# Patient Record
Sex: FEMALE | Race: WHITE | NOT HISPANIC OR LATINO | URBAN - METROPOLITAN AREA
[De-identification: names, ages, dates, MRNs, and addresses within clinical notes are randomized per-mention and may not be internally consistent; named-entity substitution may affect disease eponyms.]

---

## 2019-03-28 ENCOUNTER — APPOINTMENT (RX ONLY)
Dept: URBAN - METROPOLITAN AREA CLINIC 91 | Facility: CLINIC | Age: 49
Setting detail: DERMATOLOGY
End: 2019-03-28

## 2019-03-28 DIAGNOSIS — L71.8 OTHER ROSACEA: ICD-10-CM

## 2019-03-28 DIAGNOSIS — L21.8 OTHER SEBORRHEIC DERMATITIS: ICD-10-CM

## 2019-03-28 PROCEDURE — ? PRESCRIPTION

## 2019-03-28 PROCEDURE — 99202 OFFICE O/P NEW SF 15 MIN: CPT

## 2019-03-28 PROCEDURE — ? COUNSELING

## 2019-03-28 PROCEDURE — ? TREATMENT REGIMEN

## 2019-03-28 RX ORDER — KETOCONAZOLE 20.5 MG/ML
SHAMPOO, SUSPENSION TOPICAL BIW
Qty: 1 | Refills: 3 | Status: CANCELLED
Stop reason: CLARIF

## 2019-03-28 RX ORDER — DOXYCYCLINE HYCLATE 20 MG/1
TABLET, FILM COATED ORAL
Qty: 60 | Refills: 1

## 2019-03-28 RX ORDER — KETOCONAZOLE 20 MG/G
CREAM TOPICAL
Qty: 1 | Refills: 11

## 2019-03-28 ASSESSMENT — LOCATION SIMPLE DESCRIPTION DERM
LOCATION SIMPLE: RIGHT CHEEK
LOCATION SIMPLE: LEFT SCALP
LOCATION SIMPLE: LEFT CHEEK

## 2019-03-28 ASSESSMENT — LOCATION DETAILED DESCRIPTION DERM
LOCATION DETAILED: LEFT MEDIAL FRONTAL SCALP
LOCATION DETAILED: LEFT MEDIAL MALAR CHEEK
LOCATION DETAILED: RIGHT MEDIAL MALAR CHEEK

## 2019-03-28 ASSESSMENT — LOCATION ZONE DERM
LOCATION ZONE: SCALP
LOCATION ZONE: FACE

## 2019-03-28 NOTE — PROCEDURE: TREATMENT REGIMEN
Modify Regimen: Increase Doxycycline 20mg one tablet twice a day
Continue Regimen: Metrogel 1% cream at night. Dapsone in the morning.
Detail Level: Zone

## 2019-03-28 NOTE — HPI: SECONDARY COMPLAINT
How Severe Is This Condition?: mild
Additional History: Patient states that the Ketoconazole Shampoo helps keep it under control. \\nNo pain per patient

## 2019-03-28 NOTE — PROCEDURE: MIPS QUALITY
Quality 110: Preventive Care And Screening: Influenza Immunization: Influenza Immunization previously received during influenza season
Detail Level: Detailed
Quality 130: Documentation Of Current Medications In The Medical Record: Current Medications Documented
Quality 111:Pneumonia Vaccination Status For Older Adults: Pneumococcal Vaccination not Administered or Previously Received, Reason not Otherwise Specified
Quality 131: Pain Assessment And Follow-Up: Pain assessment using a standardized tool is documented as negative, no follow-up plan required

## 2019-03-28 NOTE — HPI: RASH (ROSACEA)
How Severe Is Your Rosacea?: mild
Is This A New Presentation, Or A Follow-Up?: Rosacea
Additional History: Patient states that she is taking Doxycycline 20mg once a day and also using Metrogel once a day to the face.

## 2019-04-29 ENCOUNTER — APPOINTMENT (RX ONLY)
Dept: URBAN - METROPOLITAN AREA CLINIC 91 | Facility: CLINIC | Age: 49
Setting detail: DERMATOLOGY
End: 2019-04-29

## 2019-04-29 DIAGNOSIS — L21.8 OTHER SEBORRHEIC DERMATITIS: ICD-10-CM

## 2019-04-29 DIAGNOSIS — L71.8 OTHER ROSACEA: ICD-10-CM

## 2019-04-29 PROCEDURE — 99213 OFFICE O/P EST LOW 20 MIN: CPT

## 2019-04-29 PROCEDURE — ? TREATMENT REGIMEN

## 2019-04-29 PROCEDURE — ? PRESCRIPTION

## 2019-04-29 PROCEDURE — ? COUNSELING

## 2019-04-29 RX ORDER — KETOCONAZOLE 20.5 MG/ML
SHAMPOO, SUSPENSION TOPICAL
Qty: 1 | Refills: 11

## 2019-04-29 RX ORDER — DAPSONE 75 MG/G
GEL TOPICAL
Qty: 1 | Refills: 11

## 2019-04-29 RX ORDER — METRONIDAZOLE 10 MG/G
GEL TOPICAL
Qty: 1 | Refills: 11

## 2019-04-29 RX ORDER — KETOCONAZOLE 20 MG/G
CREAM TOPICAL
Qty: 1 | Refills: 11

## 2019-04-29 ASSESSMENT — LOCATION SIMPLE DESCRIPTION DERM
LOCATION SIMPLE: LEFT CHEEK
LOCATION SIMPLE: RIGHT CHEEK
LOCATION SIMPLE: LEFT SCALP

## 2019-04-29 ASSESSMENT — LOCATION DETAILED DESCRIPTION DERM
LOCATION DETAILED: LEFT MEDIAL MALAR CHEEK
LOCATION DETAILED: LEFT MEDIAL FRONTAL SCALP
LOCATION DETAILED: RIGHT MEDIAL MALAR CHEEK

## 2019-04-29 ASSESSMENT — LOCATION ZONE DERM
LOCATION ZONE: SCALP
LOCATION ZONE: FACE

## 2019-04-29 NOTE — PROCEDURE: TREATMENT REGIMEN
Modify Regimen: Increase Doxycycline 20mg one tablet twice a day
Detail Level: Zone
Continue Regimen: Metrogel 1% cream at night. Dapsone in the morning.

## 2019-06-04 ENCOUNTER — RX ONLY (OUTPATIENT)
Age: 49
Setting detail: RX ONLY
End: 2019-06-04

## 2019-06-04 ENCOUNTER — APPOINTMENT (RX ONLY)
Dept: URBAN - METROPOLITAN AREA CLINIC 91 | Facility: CLINIC | Age: 49
Setting detail: DERMATOLOGY
End: 2019-06-04

## 2019-06-04 DIAGNOSIS — L71.8 OTHER ROSACEA: ICD-10-CM

## 2019-06-04 DIAGNOSIS — L21.8 OTHER SEBORRHEIC DERMATITIS: ICD-10-CM

## 2019-06-04 PROCEDURE — ? TREATMENT REGIMEN

## 2019-06-04 PROCEDURE — ? COUNSELING

## 2019-06-04 PROCEDURE — ? PRESCRIPTION

## 2019-06-04 PROCEDURE — 99213 OFFICE O/P EST LOW 20 MIN: CPT

## 2019-06-04 RX ORDER — SODIUM SULFACETAMIDE AND SULFUR 80; 40 MG/ML; MG/ML
SOLUTION TOPICAL
Qty: 1 | Refills: 11 | Status: ERX

## 2019-06-04 RX ORDER — DOXYCYCLINE HYCLATE 20 MG/1
TABLET, FILM COATED ORAL
Qty: 60 | Refills: 1 | Status: ERX

## 2019-06-04 ASSESSMENT — LOCATION SIMPLE DESCRIPTION DERM
LOCATION SIMPLE: RIGHT CHEEK
LOCATION SIMPLE: LEFT SCALP
LOCATION SIMPLE: LEFT CHEEK

## 2019-06-04 ASSESSMENT — LOCATION DETAILED DESCRIPTION DERM
LOCATION DETAILED: LEFT MEDIAL FRONTAL SCALP
LOCATION DETAILED: RIGHT MEDIAL MALAR CHEEK
LOCATION DETAILED: LEFT MEDIAL MALAR CHEEK

## 2019-06-04 ASSESSMENT — LOCATION ZONE DERM
LOCATION ZONE: SCALP
LOCATION ZONE: FACE

## 2019-06-04 NOTE — PROCEDURE: TREATMENT REGIMEN
Action 2: Continue
Continue Regimen: Ketoconazole 2% cream and ketoconazole 2% shampoo
Continue Regimen: Metrogel 1% cream at night. Dapsone in the morning.
Modify Regimen: Increase Doxycycline 20mg one tablet twice a day
Detail Level: Zone

## 2019-06-05 RX ORDER — KETOCONAZOLE 20.5 MG/ML
SHAMPOO, SUSPENSION TOPICAL
Qty: 1 | Refills: 11 | Status: ERX

## 2019-06-05 RX ORDER — KETOCONAZOLE 20 MG/G
CREAM TOPICAL
Qty: 1 | Refills: 11 | Status: ERX

## 2019-06-05 RX ORDER — DOXYCYCLINE HYCLATE 20 MG/1
TABLET, FILM COATED ORAL
Qty: 60 | Refills: 1 | Status: ERX

## 2019-06-05 RX ORDER — METRONIDAZOLE 10 MG/G
GEL TOPICAL
Qty: 1 | Refills: 11 | Status: ERX

## 2019-06-10 ENCOUNTER — APPOINTMENT (RX ONLY)
Dept: URBAN - METROPOLITAN AREA CLINIC 91 | Facility: CLINIC | Age: 49
Setting detail: DERMATOLOGY
End: 2019-06-10

## 2019-06-10 DIAGNOSIS — L259 CONTACT DERMATITIS AND OTHER ECZEMA, UNSPECIFIED CAUSE: ICD-10-CM

## 2019-06-10 PROBLEM — L23.9 ALLERGIC CONTACT DERMATITIS, UNSPECIFIED CAUSE: Status: ACTIVE | Noted: 2019-06-10

## 2019-06-10 PROCEDURE — ? PRESCRIPTION

## 2019-06-10 PROCEDURE — 99212 OFFICE O/P EST SF 10 MIN: CPT

## 2019-06-10 PROCEDURE — ? COUNSELING

## 2019-06-10 PROCEDURE — ? TREATMENT REGIMEN

## 2019-06-10 RX ORDER — HYDROCORTISONE 25 MG/G
CREAM TOPICAL
Qty: 1 | Refills: 11 | Status: ERX

## 2019-06-10 ASSESSMENT — LOCATION ZONE DERM: LOCATION ZONE: FACE

## 2019-06-10 ASSESSMENT — LOCATION DETAILED DESCRIPTION DERM: LOCATION DETAILED: LEFT INFERIOR MEDIAL MALAR CHEEK

## 2019-06-10 ASSESSMENT — LOCATION SIMPLE DESCRIPTION DERM: LOCATION SIMPLE: LEFT CHEEK

## 2019-06-10 NOTE — PROCEDURE: MIPS QUALITY
Quality 110: Preventive Care And Screening: Influenza Immunization: Influenza Immunization previously received during influenza season
Quality 130: Documentation Of Current Medications In The Medical Record: Current Medications Documented
Quality 131: Pain Assessment And Follow-Up: Pain assessment using a standardized tool is documented as negative, no follow-up plan required
Quality 111:Pneumonia Vaccination Status For Older Adults: Pneumococcal Vaccination not Administered or Previously Received, Reason not Otherwise Specified
Detail Level: Detailed

## 2019-07-09 ENCOUNTER — APPOINTMENT (RX ONLY)
Dept: URBAN - METROPOLITAN AREA CLINIC 91 | Facility: CLINIC | Age: 49
Setting detail: DERMATOLOGY
End: 2019-07-09

## 2019-07-09 DIAGNOSIS — L71.8 OTHER ROSACEA: ICD-10-CM

## 2019-07-09 DIAGNOSIS — L259 CONTACT DERMATITIS AND OTHER ECZEMA, UNSPECIFIED CAUSE: ICD-10-CM | Status: RESOLVING

## 2019-07-09 PROBLEM — L23.9 ALLERGIC CONTACT DERMATITIS, UNSPECIFIED CAUSE: Status: ACTIVE | Noted: 2019-07-09

## 2019-07-09 PROCEDURE — ? COUNSELING

## 2019-07-09 PROCEDURE — ? TREATMENT REGIMEN

## 2019-07-09 PROCEDURE — 99213 OFFICE O/P EST LOW 20 MIN: CPT

## 2019-07-09 ASSESSMENT — LOCATION ZONE DERM: LOCATION ZONE: FACE

## 2019-07-09 ASSESSMENT — LOCATION SIMPLE DESCRIPTION DERM: LOCATION SIMPLE: LEFT CHEEK

## 2019-07-09 ASSESSMENT — LOCATION DETAILED DESCRIPTION DERM
LOCATION DETAILED: LEFT INFERIOR MEDIAL MALAR CHEEK
LOCATION DETAILED: LEFT MEDIAL MALAR CHEEK

## 2019-07-09 NOTE — PROCEDURE: MIPS QUALITY
Quality 110: Preventive Care And Screening: Influenza Immunization: Influenza Immunization previously received during influenza season
Quality 111:Pneumonia Vaccination Status For Older Adults: Pneumococcal Vaccination not Administered or Previously Received, Reason not Otherwise Specified
Quality 130: Documentation Of Current Medications In The Medical Record: Current Medications Documented
Quality 131: Pain Assessment And Follow-Up: Pain assessment using a standardized tool is documented as negative, no follow-up plan required
Detail Level: Detailed

## 2019-07-09 NOTE — PROCEDURE: TREATMENT REGIMEN
Detail Level: Zone
Continue Regimen: Metrogel 1% cream at bedtime\\nDapsone cream once daily in the morning\\nDoxycycline 20mg one tablet twice a day
Discontinue Regimen: Hydrocortisone 2.5% cream

## 2019-08-13 ENCOUNTER — APPOINTMENT (RX ONLY)
Dept: URBAN - METROPOLITAN AREA CLINIC 91 | Facility: CLINIC | Age: 49
Setting detail: DERMATOLOGY
End: 2019-08-13

## 2019-08-13 DIAGNOSIS — L71.8 OTHER ROSACEA: ICD-10-CM | Status: WELL CONTROLLED

## 2019-08-13 DIAGNOSIS — L259 CONTACT DERMATITIS AND OTHER ECZEMA, UNSPECIFIED CAUSE: ICD-10-CM | Status: RESOLVED

## 2019-08-13 DIAGNOSIS — L21.8 OTHER SEBORRHEIC DERMATITIS: ICD-10-CM

## 2019-08-13 PROBLEM — L23.9 ALLERGIC CONTACT DERMATITIS, UNSPECIFIED CAUSE: Status: ACTIVE | Noted: 2019-08-13

## 2019-08-13 PROCEDURE — ? COUNSELING

## 2019-08-13 PROCEDURE — ? TREATMENT REGIMEN

## 2019-08-13 PROCEDURE — 99213 OFFICE O/P EST LOW 20 MIN: CPT

## 2019-08-13 ASSESSMENT — LOCATION ZONE DERM
LOCATION ZONE: FACE
LOCATION ZONE: SCALP

## 2019-08-13 ASSESSMENT — LOCATION DETAILED DESCRIPTION DERM
LOCATION DETAILED: LEFT MEDIAL MALAR CHEEK
LOCATION DETAILED: LEFT MEDIAL FRONTAL SCALP
LOCATION DETAILED: LEFT INFERIOR MEDIAL MALAR CHEEK

## 2019-08-13 ASSESSMENT — LOCATION SIMPLE DESCRIPTION DERM
LOCATION SIMPLE: LEFT CHEEK
LOCATION SIMPLE: LEFT SCALP

## 2019-08-13 NOTE — PROCEDURE: TREATMENT REGIMEN
Continue Regimen: Nizoral Shampoo
Continue Regimen: Metronidazole cream at bedtime\\nDapsone cream once daily in the morning\\nDoxycycline 20mg one tablet twice a day
Detail Level: Zone
Discontinue Regimen: Hydrocortisone 2.5% cream

## 2019-08-13 NOTE — PROCEDURE: MIPS QUALITY
Quality 110: Preventive Care And Screening: Influenza Immunization: Influenza Immunization previously received during influenza season
Detail Level: Detailed
Quality 131: Pain Assessment And Follow-Up: Pain assessment using a standardized tool is documented as negative, no follow-up plan required
Quality 111:Pneumonia Vaccination Status For Older Adults: Pneumococcal Vaccination not Administered or Previously Received, Reason not Otherwise Specified
Quality 130: Documentation Of Current Medications In The Medical Record: Current Medications Documented

## 2019-10-03 ENCOUNTER — APPOINTMENT (RX ONLY)
Dept: URBAN - METROPOLITAN AREA CLINIC 91 | Facility: CLINIC | Age: 49
Setting detail: DERMATOLOGY
End: 2019-10-03

## 2019-10-03 DIAGNOSIS — L30.9 DERMATITIS, UNSPECIFIED: ICD-10-CM

## 2019-10-03 PROCEDURE — ? PRESCRIPTION

## 2019-10-03 PROCEDURE — ? ORDER TESTS

## 2019-10-03 PROCEDURE — 99213 OFFICE O/P EST LOW 20 MIN: CPT

## 2019-10-03 PROCEDURE — ? COUNSELING

## 2019-10-03 RX ORDER — MOMETASONE FUROATE 1 MG/G
CREAM TOPICAL
Qty: 1 | Refills: 0 | Status: ERX

## 2019-10-03 RX ORDER — DOXYCYCLINE HYCLATE 100 MG/1
CAPSULE, GELATIN COATED ORAL
Qty: 14 | Refills: 0 | Status: ERX

## 2019-10-03 RX ORDER — MUPIROCIN 20 MG/G
OINTMENT TOPICAL
Qty: 1 | Refills: 11 | Status: ERX

## 2019-10-03 ASSESSMENT — LOCATION ZONE DERM
LOCATION ZONE: LIP
LOCATION ZONE: FACE

## 2019-10-03 ASSESSMENT — LOCATION SIMPLE DESCRIPTION DERM
LOCATION SIMPLE: RIGHT EYEBROW
LOCATION SIMPLE: LEFT EYEBROW
LOCATION SIMPLE: UPPER LIP

## 2019-10-03 ASSESSMENT — LOCATION DETAILED DESCRIPTION DERM
LOCATION DETAILED: RIGHT CENTRAL EYEBROW
LOCATION DETAILED: LEFT CENTRAL EYEBROW
LOCATION DETAILED: PHILTRUM

## 2019-10-03 NOTE — PROCEDURE: MIPS QUALITY
Quality 130: Documentation Of Current Medications In The Medical Record: Current Medications Documented
Quality 110: Preventive Care And Screening: Influenza Immunization: Influenza Immunization previously received during influenza season
Detail Level: Detailed
Quality 111:Pneumonia Vaccination Status For Older Adults: Pneumococcal Vaccination not Administered or Previously Received, Reason not Otherwise Specified

## 2019-10-03 NOTE — HPI: RASH
What Type Of Note Output Would You Prefer (Optional)?: Standard Output
How Severe Is Your Rash?: severe
Is This A New Presentation, Or A Follow-Up?: Rash
Additional History: 0/10 on pain scale.

## 2019-10-11 ENCOUNTER — APPOINTMENT (RX ONLY)
Dept: URBAN - METROPOLITAN AREA CLINIC 91 | Facility: CLINIC | Age: 49
Setting detail: DERMATOLOGY
End: 2019-10-11

## 2019-10-11 DIAGNOSIS — L30.9 DERMATITIS, UNSPECIFIED: ICD-10-CM

## 2019-10-11 PROCEDURE — ? PRESCRIPTION

## 2019-10-11 PROCEDURE — ? TREATMENT REGIMEN

## 2019-10-11 PROCEDURE — ? DIAGNOSIS COMMENT

## 2019-10-11 PROCEDURE — ? COUNSELING

## 2019-10-11 PROCEDURE — 99213 OFFICE O/P EST LOW 20 MIN: CPT

## 2019-10-11 RX ORDER — CIPROFLOXACIN HYDROCHLORIDE 500 MG/1
TABLET, FILM COATED ORAL
Qty: 14 | Refills: 0 | Status: ERX

## 2019-10-11 NOTE — PROCEDURE: TREATMENT REGIMEN
Discontinue Regimen: Doxycycline hyclate
Detail Level: Zone
Plan: Initiate Cipro and continue topicals for one week, discontinue regimen for one week, then follow up
Continue Regimen: Mupirocin ointment twice daily and Elocon cream twice daily

## 2019-10-29 ENCOUNTER — APPOINTMENT (RX ONLY)
Dept: URBAN - METROPOLITAN AREA CLINIC 91 | Facility: CLINIC | Age: 49
Setting detail: DERMATOLOGY
End: 2019-10-29

## 2019-10-29 DIAGNOSIS — L21.8 OTHER SEBORRHEIC DERMATITIS: ICD-10-CM

## 2019-10-29 DIAGNOSIS — L30.9 DERMATITIS, UNSPECIFIED: ICD-10-CM | Status: IMPROVED

## 2019-10-29 PROCEDURE — ? DIAGNOSIS COMMENT

## 2019-10-29 PROCEDURE — 99213 OFFICE O/P EST LOW 20 MIN: CPT

## 2019-10-29 PROCEDURE — ? TREATMENT REGIMEN

## 2019-10-29 PROCEDURE — ? COUNSELING

## 2019-10-29 PROCEDURE — ? PRESCRIPTION

## 2019-10-29 RX ORDER — TACROLIMUS 1 MG/G
OINTMENT TOPICAL BID
Qty: 1 | Refills: 11 | Status: ERX

## 2019-10-29 ASSESSMENT — LOCATION SIMPLE DESCRIPTION DERM
LOCATION SIMPLE: LEFT CHEEK
LOCATION SIMPLE: LEFT SCALP

## 2019-10-29 ASSESSMENT — LOCATION ZONE DERM
LOCATION ZONE: FACE
LOCATION ZONE: SCALP

## 2019-10-29 ASSESSMENT — LOCATION DETAILED DESCRIPTION DERM
LOCATION DETAILED: LEFT INFERIOR CENTRAL MALAR CHEEK
LOCATION DETAILED: LEFT MEDIAL FRONTAL SCALP

## 2019-10-29 NOTE — PROCEDURE: MIPS QUALITY
Quality 111:Pneumonia Vaccination Status For Older Adults: Pneumococcal Vaccination not Administered or Previously Received, Reason not Otherwise Specified
Quality 131: Pain Assessment And Follow-Up: Pain assessment using a standardized tool is documented as negative, no follow-up plan required
Quality 130: Documentation Of Current Medications In The Medical Record: Current Medications Documented
Quality 110: Preventive Care And Screening: Influenza Immunization: Influenza Immunization previously received during influenza season
Detail Level: Detailed

## 2019-10-29 NOTE — PROCEDURE: TREATMENT REGIMEN
Continue Regimen: Mupirocin ointment twice daily
Continue Regimen: Nizoral Shampoo wash daily for flares, then two to three times a week for maintenance
Discontinue Regimen: Cipro 500mg and Elocon cream
Detail Level: Zone

## 2019-11-12 ENCOUNTER — APPOINTMENT (RX ONLY)
Dept: URBAN - METROPOLITAN AREA CLINIC 91 | Facility: CLINIC | Age: 49
Setting detail: DERMATOLOGY
End: 2019-11-12

## 2019-11-12 DIAGNOSIS — L30.9 DERMATITIS, UNSPECIFIED: ICD-10-CM | Status: RESOLVING

## 2019-11-12 DIAGNOSIS — D22 MELANOCYTIC NEVI: ICD-10-CM

## 2019-11-12 PROBLEM — D48.5 NEOPLASM OF UNCERTAIN BEHAVIOR OF SKIN: Status: ACTIVE | Noted: 2019-11-12

## 2019-11-12 PROCEDURE — ? DIAGNOSIS COMMENT

## 2019-11-12 PROCEDURE — 11102 TANGNTL BX SKIN SINGLE LES: CPT

## 2019-11-12 PROCEDURE — ? BIOPSY BY SHAVE METHOD

## 2019-11-12 PROCEDURE — ? TREATMENT REGIMEN

## 2019-11-12 PROCEDURE — ? COUNSELING

## 2019-11-12 PROCEDURE — 99213 OFFICE O/P EST LOW 20 MIN: CPT | Mod: 25

## 2019-11-12 ASSESSMENT — LOCATION SIMPLE DESCRIPTION DERM
LOCATION SIMPLE: LEFT CHEEK
LOCATION SIMPLE: RIGHT TEMPLE

## 2019-11-12 ASSESSMENT — LOCATION ZONE DERM: LOCATION ZONE: FACE

## 2019-11-12 ASSESSMENT — LOCATION DETAILED DESCRIPTION DERM
LOCATION DETAILED: LEFT INFERIOR CENTRAL MALAR CHEEK
LOCATION DETAILED: RIGHT CENTRAL TEMPLE

## 2019-11-12 NOTE — PROCEDURE: BIOPSY BY SHAVE METHOD
Anesthesia Type: 1% lidocaine with epinephrine
Hemostasis: Electrocautery
Cryotherapy Text: The wound bed was treated with cryotherapy after the biopsy was performed.
Render In Bullet Format When Appropriate: No
Silver Nitrate Text: The wound bed was treated with silver nitrate after the biopsy was performed.
Billing Type: [employer]
Electrodesiccation And Curettage Text: The wound bed was treated with electrodesiccation and curettage after the biopsy was performed.
Was A Bandage Applied: Yes
Depth Of Biopsy: dermis
Additional Anesthesia Volume In Cc (Will Not Render If 0): 0
Wound Care: Vaseline
Curettage Text: The wound bed was treated with curettage after the biopsy was performed.
Biopsy Type: H and E
Post-care instructions reviewed with the patient in detail. The patient is to keep the biopsy site dry overnight. Remove the bandage and shower as normal with soap and water, dry the area, apply vaseline and a band-aid. Repeat this process daily for five days.
Dressing: bandage
Notification Instructions: Patient will be notified of biopsy results. However, patient instructed to call the office if not contacted within 2 weeks.
Anesthesia Volume In Cc (Will Not Render If 0): 0.5
Biopsy Method: 15 blade
Electrodesiccation Text: The wound bed was treated with electrodesiccation after the biopsy was performed.
Consent was obtained and risks were reviewed including but not limited to scarring, infection, bleeding, scabbing, incomplete removal, nerve damage and allergy to anesthesia.
Type Of Destruction Used: Curettage
Detail Level: Detailed

## 2019-11-12 NOTE — PROCEDURE: MIPS QUALITY
Quality 130: Documentation Of Current Medications In The Medical Record: Current Medications Documented
Quality 111:Pneumonia Vaccination Status For Older Adults: Pneumococcal Vaccination not Administered or Previously Received, Reason not Otherwise Specified
Quality 110: Preventive Care And Screening: Influenza Immunization: Influenza Immunization previously received during influenza season
Detail Level: Detailed
Quality 131: Pain Assessment And Follow-Up: Pain assessment using a standardized tool is documented as negative, no follow-up plan required

## 2019-11-12 NOTE — PROCEDURE: TREATMENT REGIMEN
Detail Level: Zone
Continue Regimen: Protopic ointment twice daily
Discontinue Regimen: Mupirocin ointment

## 2019-12-05 ENCOUNTER — APPOINTMENT (RX ONLY)
Dept: URBAN - METROPOLITAN AREA CLINIC 91 | Facility: CLINIC | Age: 49
Setting detail: DERMATOLOGY
End: 2019-12-05

## 2019-12-05 DIAGNOSIS — L30.9 DERMATITIS, UNSPECIFIED: ICD-10-CM

## 2019-12-05 PROCEDURE — 99213 OFFICE O/P EST LOW 20 MIN: CPT

## 2019-12-05 PROCEDURE — ? COUNSELING

## 2019-12-05 PROCEDURE — ? TREATMENT REGIMEN

## 2019-12-05 ASSESSMENT — LOCATION DETAILED DESCRIPTION DERM: LOCATION DETAILED: LEFT INFERIOR CENTRAL MALAR CHEEK

## 2019-12-05 ASSESSMENT — LOCATION ZONE DERM: LOCATION ZONE: FACE

## 2019-12-05 ASSESSMENT — LOCATION SIMPLE DESCRIPTION DERM: LOCATION SIMPLE: LEFT CHEEK

## 2019-12-05 NOTE — PROCEDURE: TREATMENT REGIMEN
Discontinue Regimen: Mupirocin ointment and protopic ointment
Plan: Protopic ointment twice daily as needed for flares
Detail Level: Zone

## 2020-01-07 ENCOUNTER — APPOINTMENT (RX ONLY)
Dept: URBAN - METROPOLITAN AREA CLINIC 91 | Facility: CLINIC | Age: 50
Setting detail: DERMATOLOGY
End: 2020-01-07

## 2020-01-07 DIAGNOSIS — L21.8 OTHER SEBORRHEIC DERMATITIS: ICD-10-CM

## 2020-01-07 PROCEDURE — ? TREATMENT REGIMEN

## 2020-01-07 PROCEDURE — ? COUNSELING

## 2020-01-07 PROCEDURE — ? DIAGNOSIS COMMENT

## 2020-01-07 PROCEDURE — 99213 OFFICE O/P EST LOW 20 MIN: CPT

## 2020-01-07 ASSESSMENT — LOCATION SIMPLE DESCRIPTION DERM: LOCATION SIMPLE: RIGHT CHEEK

## 2020-01-07 ASSESSMENT — LOCATION ZONE DERM: LOCATION ZONE: FACE

## 2020-01-07 ASSESSMENT — LOCATION DETAILED DESCRIPTION DERM: LOCATION DETAILED: RIGHT INFERIOR MEDIAL MALAR CHEEK

## 2020-01-07 NOTE — PROCEDURE: TREATMENT REGIMEN
Initiate Treatment: Protopic ointment twice daily \\nDoxycycline hyclate 100 mg twice a day, patient has at home
Detail Level: Zone

## 2020-01-07 NOTE — PROCEDURE: MIPS QUALITY
Quality 130: Documentation Of Current Medications In The Medical Record: Current Medications Documented
Detail Level: Detailed
Quality 111:Pneumonia Vaccination Status For Older Adults: Pneumococcal Vaccination not Administered or Previously Received, Reason not Otherwise Specified
Quality 131: Pain Assessment And Follow-Up: Pain assessment using a standardized tool is documented as negative, no follow-up plan required
Quality 110: Preventive Care And Screening: Influenza Immunization: Influenza Immunization previously received during influenza season

## 2020-01-28 ENCOUNTER — APPOINTMENT (RX ONLY)
Dept: URBAN - METROPOLITAN AREA CLINIC 91 | Facility: CLINIC | Age: 50
Setting detail: DERMATOLOGY
End: 2020-01-28

## 2020-01-28 DIAGNOSIS — L71.8 OTHER ROSACEA: ICD-10-CM

## 2020-01-28 DIAGNOSIS — L21.8 OTHER SEBORRHEIC DERMATITIS: ICD-10-CM | Status: IMPROVED

## 2020-01-28 PROCEDURE — ? TREATMENT REGIMEN

## 2020-01-28 PROCEDURE — 99213 OFFICE O/P EST LOW 20 MIN: CPT

## 2020-01-28 PROCEDURE — ? COUNSELING

## 2020-01-28 ASSESSMENT — LOCATION DETAILED DESCRIPTION DERM
LOCATION DETAILED: LEFT MEDIAL MALAR CHEEK
LOCATION DETAILED: RIGHT INFERIOR MEDIAL MALAR CHEEK

## 2020-01-28 ASSESSMENT — LOCATION SIMPLE DESCRIPTION DERM
LOCATION SIMPLE: RIGHT CHEEK
LOCATION SIMPLE: LEFT CHEEK

## 2020-01-28 ASSESSMENT — LOCATION ZONE DERM: LOCATION ZONE: FACE

## 2020-01-28 NOTE — PROCEDURE: TREATMENT REGIMEN
Detail Level: Zone
Initiate Treatment: Protopic ointment twice daily \\nDoxycycline hyclate 20 mg twice a day, patient has at home
Plan: Taking doxycycline 20 mg twice daily

## 2020-01-28 NOTE — PROCEDURE: MIPS QUALITY
Detail Level: Detailed
Quality 110: Preventive Care And Screening: Influenza Immunization: Influenza Immunization previously received during influenza season
Quality 130: Documentation Of Current Medications In The Medical Record: Current Medications Documented
Quality 131: Pain Assessment And Follow-Up: Pain assessment using a standardized tool is documented as negative, no follow-up plan required
Quality 111:Pneumonia Vaccination Status For Older Adults: Pneumococcal Vaccination not Administered or Previously Received, Reason not Otherwise Specified

## 2020-02-25 ENCOUNTER — APPOINTMENT (RX ONLY)
Dept: URBAN - METROPOLITAN AREA CLINIC 91 | Facility: CLINIC | Age: 50
Setting detail: DERMATOLOGY
End: 2020-02-25

## 2020-02-25 DIAGNOSIS — L21.8 OTHER SEBORRHEIC DERMATITIS: ICD-10-CM | Status: IMPROVED

## 2020-02-25 DIAGNOSIS — L71.8 OTHER ROSACEA: ICD-10-CM | Status: IMPROVED

## 2020-02-25 PROCEDURE — 99213 OFFICE O/P EST LOW 20 MIN: CPT

## 2020-02-25 PROCEDURE — ? TREATMENT REGIMEN

## 2020-02-25 PROCEDURE — ? COUNSELING

## 2020-02-25 ASSESSMENT — LOCATION DETAILED DESCRIPTION DERM
LOCATION DETAILED: LEFT MEDIAL MALAR CHEEK
LOCATION DETAILED: RIGHT INFERIOR MEDIAL MALAR CHEEK

## 2020-02-25 ASSESSMENT — LOCATION ZONE DERM: LOCATION ZONE: FACE

## 2020-02-25 ASSESSMENT — LOCATION SIMPLE DESCRIPTION DERM
LOCATION SIMPLE: LEFT CHEEK
LOCATION SIMPLE: RIGHT CHEEK

## 2020-02-25 NOTE — PROCEDURE: MIPS QUALITY
Detail Level: Detailed
Quality 131: Pain Assessment And Follow-Up: Pain assessment using a standardized tool is documented as negative, no follow-up plan required
Quality 111:Pneumonia Vaccination Status For Older Adults: Pneumococcal Vaccination not Administered or Previously Received, Reason not Otherwise Specified
Quality 110: Preventive Care And Screening: Influenza Immunization: Influenza Immunization previously received during influenza season
Quality 130: Documentation Of Current Medications In The Medical Record: Current Medications Documented

## 2020-02-25 NOTE — PROCEDURE: TREATMENT REGIMEN
Continue Regimen: Doxycycline 20 mg twice a day and protopic twice a day for 6 more weeks
Detail Level: Zone
Continue Regimen: Doxycycline 20mg twice a day for 6 more weeks

## 2020-05-27 ENCOUNTER — APPOINTMENT (RX ONLY)
Dept: URBAN - METROPOLITAN AREA CLINIC 91 | Facility: CLINIC | Age: 50
Setting detail: DERMATOLOGY
End: 2020-05-27

## 2020-05-27 DIAGNOSIS — L71.8 OTHER ROSACEA: ICD-10-CM

## 2020-05-27 DIAGNOSIS — L21.8 OTHER SEBORRHEIC DERMATITIS: ICD-10-CM

## 2020-05-27 PROCEDURE — 99213 OFFICE O/P EST LOW 20 MIN: CPT

## 2020-05-27 PROCEDURE — ? REASON FOR TELEMEDICINE VISIT

## 2020-05-27 PROCEDURE — ? PRESCRIPTION

## 2020-05-27 PROCEDURE — ? CONSENT FOR TELEMEDICINE VISIT OBTAINED

## 2020-05-27 PROCEDURE — ? TREATMENT REGIMEN

## 2020-05-27 PROCEDURE — ? COUNSELING

## 2020-05-27 RX ORDER — IVERMECTIN 10 MG/G
CREAM TOPICAL
Qty: 1 | Refills: 11 | Status: ERX

## 2020-05-27 ASSESSMENT — LOCATION SIMPLE DESCRIPTION DERM
LOCATION SIMPLE: LEFT CHEEK
LOCATION SIMPLE: RIGHT CHEEK

## 2020-05-27 ASSESSMENT — LOCATION DETAILED DESCRIPTION DERM
LOCATION DETAILED: RIGHT INFERIOR MEDIAL MALAR CHEEK
LOCATION DETAILED: LEFT MEDIAL MALAR CHEEK

## 2020-05-27 ASSESSMENT — LOCATION ZONE DERM: LOCATION ZONE: FACE

## 2020-05-27 NOTE — PROCEDURE: MIPS QUALITY
Quality 111:Pneumonia Vaccination Status For Older Adults: Pneumococcal Vaccination not Administered or Previously Received, Reason not Otherwise Specified
Quality 110: Preventive Care And Screening: Influenza Immunization: Influenza Immunization previously received during influenza season
Detail Level: Detailed
Quality 131: Pain Assessment And Follow-Up: Pain assessment using a standardized tool is documented as negative, no follow-up plan required
Quality 130: Documentation Of Current Medications In The Medical Record: Current Medications Documented

## 2020-05-27 NOTE — PROCEDURE: TREATMENT REGIMEN
Modify Regimen: Protopic at bedtime
Continue Regimen: Doxycycline 20mg twice a day
Continue Regimen: Ketoconazole shampoo 3 times a week
Detail Level: Zone
Otc Regimen: Cerave mineral sunscreen daily

## 2020-05-27 NOTE — PROCEDURE: CONSENT FOR TELEMEDICINE VISIT OBTAINED
Consent Text: Telehealth video conferencing is a virtual consultation that will not be the same as an in person visit the visit will be billed to applicable Insurance provider that you have provided and patient financial responsibility will still apply in accordance with your benefits. Patient gives verbal consent to engage in the tele-health virtual visit today.

## 2020-06-12 RX ORDER — KETOCONAZOLE 20.5 MG/ML
SHAMPOO, SUSPENSION TOPICAL
Qty: 1 | Refills: 10 | Status: ERX

## 2020-07-22 ENCOUNTER — APPOINTMENT (RX ONLY)
Dept: URBAN - METROPOLITAN AREA CLINIC 91 | Facility: CLINIC | Age: 50
Setting detail: DERMATOLOGY
End: 2020-07-22

## 2020-07-22 DIAGNOSIS — L21.8 OTHER SEBORRHEIC DERMATITIS: ICD-10-CM

## 2020-07-22 DIAGNOSIS — L71.8 OTHER ROSACEA: ICD-10-CM

## 2020-07-22 PROCEDURE — ? CONSENT FOR TELEMEDICINE VISIT OBTAINED

## 2020-07-22 PROCEDURE — ? TREATMENT REGIMEN

## 2020-07-22 PROCEDURE — ? PRESCRIPTION

## 2020-07-22 PROCEDURE — ? REASON FOR TELEMEDICINE VISIT

## 2020-07-22 PROCEDURE — 99213 OFFICE O/P EST LOW 20 MIN: CPT

## 2020-07-22 PROCEDURE — ? COUNSELING

## 2020-07-22 RX ORDER — DOXYCYCLINE HYCLATE 20 MG/1
TABLET, FILM COATED ORAL
Qty: 30 | Refills: 1 | Status: ERX

## 2020-07-22 ASSESSMENT — LOCATION SIMPLE DESCRIPTION DERM
LOCATION SIMPLE: LEFT CHEEK
LOCATION SIMPLE: RIGHT CHEEK
LOCATION SIMPLE: POSTERIOR SCALP

## 2020-07-22 ASSESSMENT — LOCATION ZONE DERM
LOCATION ZONE: SCALP
LOCATION ZONE: FACE

## 2020-07-22 ASSESSMENT — LOCATION DETAILED DESCRIPTION DERM
LOCATION DETAILED: LEFT MEDIAL MALAR CHEEK
LOCATION DETAILED: POSTERIOR MID-PARIETAL SCALP
LOCATION DETAILED: RIGHT INFERIOR MEDIAL MALAR CHEEK

## 2020-07-22 NOTE — PROCEDURE: TREATMENT REGIMEN
Detail Level: Zone
Continue Regimen: Apply soolantra cream to face every morning and protopic ointment apply to face at night
Continue Regimen: Ketoconazole shampoo 3 times a week
Modify Regimen: Doxycycline 20mg one pill once a day
Modify Regimen: Protopic at bedtime
Otc Regimen: Cerave mineral sunscreen daily

## 2020-09-02 ENCOUNTER — APPOINTMENT (RX ONLY)
Dept: URBAN - METROPOLITAN AREA CLINIC 91 | Facility: CLINIC | Age: 50
Setting detail: DERMATOLOGY
End: 2020-09-02

## 2020-09-02 DIAGNOSIS — L21.8 OTHER SEBORRHEIC DERMATITIS: ICD-10-CM | Status: WELL CONTROLLED

## 2020-09-02 DIAGNOSIS — L71.8 OTHER ROSACEA: ICD-10-CM | Status: WELL CONTROLLED

## 2020-09-02 PROCEDURE — 99213 OFFICE O/P EST LOW 20 MIN: CPT

## 2020-09-02 PROCEDURE — ? CONSENT FOR TELEMEDICINE VISIT OBTAINED

## 2020-09-02 PROCEDURE — ? COUNSELING

## 2020-09-02 PROCEDURE — ? REASON FOR TELEMEDICINE VISIT

## 2020-09-02 PROCEDURE — ? TREATMENT REGIMEN

## 2020-09-02 ASSESSMENT — LOCATION DETAILED DESCRIPTION DERM
LOCATION DETAILED: LEFT MEDIAL MALAR CHEEK
LOCATION DETAILED: RIGHT INFERIOR MEDIAL MALAR CHEEK
LOCATION DETAILED: POSTERIOR MID-PARIETAL SCALP

## 2020-09-02 ASSESSMENT — LOCATION ZONE DERM
LOCATION ZONE: SCALP
LOCATION ZONE: FACE

## 2020-09-02 ASSESSMENT — LOCATION SIMPLE DESCRIPTION DERM
LOCATION SIMPLE: LEFT CHEEK
LOCATION SIMPLE: POSTERIOR SCALP
LOCATION SIMPLE: RIGHT CHEEK

## 2020-09-02 NOTE — PROCEDURE: TREATMENT REGIMEN
Detail Level: Zone
Continue Regimen: Ketoconazole shampoo 3 times a week and protopic ointment apply to face at night
Plan: Patient states she has enough of doxycycline for now. Advised to call if she runs out before her next appt and we can call in a prescription to cover her until her next appt.  Cerave mineral sunscreen daily as needed
Continue Regimen: Apply soolantra cream to face every morning and protopic ointment apply to face at night\\nDoxycycline 20mg one pill once a day

## 2020-11-03 ENCOUNTER — APPOINTMENT (RX ONLY)
Dept: URBAN - METROPOLITAN AREA CLINIC 91 | Facility: CLINIC | Age: 50
Setting detail: DERMATOLOGY
End: 2020-11-03

## 2020-11-03 DIAGNOSIS — T300 ERYTHEMA [FIRST DEGREE], UNSPECIFIED SITE: ICD-10-CM

## 2020-11-03 DIAGNOSIS — L71.8 OTHER ROSACEA: ICD-10-CM | Status: WELL CONTROLLED

## 2020-11-03 DIAGNOSIS — L21.8 OTHER SEBORRHEIC DERMATITIS: ICD-10-CM | Status: WELL CONTROLLED

## 2020-11-03 PROBLEM — T22.111A BURN OF FIRST DEGREE OF RIGHT FOREARM, INITIAL ENCOUNTER: Status: ACTIVE | Noted: 2020-11-03

## 2020-11-03 PROCEDURE — ? REASON FOR TELEMEDICINE VISIT

## 2020-11-03 PROCEDURE — ? TREATMENT REGIMEN

## 2020-11-03 PROCEDURE — 99213 OFFICE O/P EST LOW 20 MIN: CPT

## 2020-11-03 PROCEDURE — ? COUNSELING

## 2020-11-03 PROCEDURE — ? CONSENT FOR TELEMEDICINE VISIT OBTAINED

## 2020-11-03 ASSESSMENT — LOCATION SIMPLE DESCRIPTION DERM
LOCATION SIMPLE: RIGHT FOREARM
LOCATION SIMPLE: POSTERIOR SCALP
LOCATION SIMPLE: RIGHT CHEEK
LOCATION SIMPLE: LEFT CHEEK

## 2020-11-03 ASSESSMENT — LOCATION ZONE DERM
LOCATION ZONE: SCALP
LOCATION ZONE: FACE
LOCATION ZONE: ARM

## 2020-11-03 ASSESSMENT — LOCATION DETAILED DESCRIPTION DERM
LOCATION DETAILED: POSTERIOR MID-PARIETAL SCALP
LOCATION DETAILED: RIGHT INFERIOR MEDIAL MALAR CHEEK
LOCATION DETAILED: RIGHT DISTAL DORSAL FOREARM
LOCATION DETAILED: LEFT MEDIAL MALAR CHEEK

## 2020-11-03 NOTE — PROCEDURE: MIPS QUALITY
Quality 111:Pneumonia Vaccination Status For Older Adults: Pneumococcal Vaccination not Administered or Previously Received, Reason not Otherwise Specified
Quality 110: Preventive Care And Screening: Influenza Immunization: Influenza Immunization previously received during influenza season
Quality 131: Pain Assessment And Follow-Up: Pain assessment using a standardized tool is documented as negative, no follow-up plan required
Detail Level: Detailed
Quality 130: Documentation Of Current Medications In The Medical Record: Current Medications Documented

## 2020-11-03 NOTE — PROCEDURE: TREATMENT REGIMEN
Discontinue Regimen: Doxycycline hyclate
Otc Regimen: Cerave mineral sunscreen daily as needed
Otc Regimen: Aloe and vitamin E
Continue Regimen: Ketoconazole shampoo 3 times a week and protopic ointment apply to face at night
Detail Level: Zone
Continue Regimen: Apply soolantra cream to face every morning and protopic ointment apply to face at night

## 2020-11-04 ENCOUNTER — RX ONLY (OUTPATIENT)
Age: 50
Setting detail: RX ONLY
End: 2020-11-04

## 2020-11-04 RX ORDER — TACROLIMUS 1 MG/G
OINTMENT TOPICAL BID
Qty: 1 | Refills: 11 | Status: ERX

## 2021-02-11 ENCOUNTER — APPOINTMENT (RX ONLY)
Dept: URBAN - METROPOLITAN AREA CLINIC 91 | Facility: CLINIC | Age: 51
Setting detail: DERMATOLOGY
End: 2021-02-11

## 2021-02-11 DIAGNOSIS — L21.8 OTHER SEBORRHEIC DERMATITIS: ICD-10-CM | Status: WELL CONTROLLED

## 2021-02-11 DIAGNOSIS — L71.8 OTHER ROSACEA: ICD-10-CM | Status: WELL CONTROLLED

## 2021-02-11 PROCEDURE — ? PRESCRIPTION

## 2021-02-11 PROCEDURE — ? CONSENT FOR TELEMEDICINE VISIT OBTAINED

## 2021-02-11 PROCEDURE — ? PRESCRIPTION MEDICATION MANAGEMENT

## 2021-02-11 PROCEDURE — ? REASON FOR TELEMEDICINE VISIT

## 2021-02-11 PROCEDURE — ? COUNSELING

## 2021-02-11 PROCEDURE — 99214 OFFICE O/P EST MOD 30 MIN: CPT

## 2021-02-11 RX ORDER — TACROLIMUS 1 MG/G
OINTMENT TOPICAL
Qty: 1 | Refills: 11 | Status: ERX

## 2021-02-11 ASSESSMENT — LOCATION ZONE DERM
LOCATION ZONE: SCALP
LOCATION ZONE: FACE

## 2021-02-11 ASSESSMENT — LOCATION SIMPLE DESCRIPTION DERM
LOCATION SIMPLE: LEFT CHEEK
LOCATION SIMPLE: POSTERIOR SCALP
LOCATION SIMPLE: RIGHT CHEEK

## 2021-02-11 ASSESSMENT — LOCATION DETAILED DESCRIPTION DERM
LOCATION DETAILED: RIGHT INFERIOR MEDIAL MALAR CHEEK
LOCATION DETAILED: LEFT MEDIAL MALAR CHEEK
LOCATION DETAILED: POSTERIOR MID-PARIETAL SCALP

## 2021-02-11 NOTE — PROCEDURE: PRESCRIPTION MEDICATION MANAGEMENT
Continue Regimen: Ketoconazole shampoo 3 times a week
Detail Level: Detailed
Render In Strict Bullet Format?: No
Continue Regimen: Apply soolantra cream to face every morning\\nProtopic ointment apply to face at night\\nCerave mineral sunscreen daily as needed

## 2021-06-03 ENCOUNTER — APPOINTMENT (RX ONLY)
Dept: URBAN - METROPOLITAN AREA CLINIC 91 | Facility: CLINIC | Age: 51
Setting detail: DERMATOLOGY
End: 2021-06-03

## 2021-06-03 DIAGNOSIS — L71.8 OTHER ROSACEA: ICD-10-CM | Status: IMPROVED

## 2021-06-03 DIAGNOSIS — L21.8 OTHER SEBORRHEIC DERMATITIS: ICD-10-CM | Status: WELL CONTROLLED

## 2021-06-03 PROCEDURE — ? PRESCRIPTION MEDICATION MANAGEMENT

## 2021-06-03 PROCEDURE — 99214 OFFICE O/P EST MOD 30 MIN: CPT

## 2021-06-03 PROCEDURE — ? COUNSELING

## 2021-06-03 ASSESSMENT — LOCATION SIMPLE DESCRIPTION DERM
LOCATION SIMPLE: LEFT CHEEK
LOCATION SIMPLE: RIGHT CHEEK
LOCATION SIMPLE: POSTERIOR SCALP

## 2021-06-03 ASSESSMENT — LOCATION ZONE DERM
LOCATION ZONE: SCALP
LOCATION ZONE: FACE

## 2021-06-03 NOTE — PROCEDURE: MIPS QUALITY
Detail Level: Detailed
Quality 111:Pneumonia Vaccination Status For Older Adults: Pneumococcal Vaccination not Administered or Previously Received, Reason not Otherwise Specified
Quality 130: Documentation Of Current Medications In The Medical Record: Current Medications Documented
Quality 131: Pain Assessment And Follow-Up: Pain assessment using a standardized tool is documented as negative, no follow-up plan required
Quality 110: Preventive Care And Screening: Influenza Immunization: Influenza Immunization previously received during influenza season

## 2021-06-03 NOTE — PROCEDURE: PRESCRIPTION MEDICATION MANAGEMENT
Continue Regimen: Ketoconazole shampoo 3 times a week as needed
Render In Strict Bullet Format?: No
Continue Regimen: Protopic ointment 0.1% apply to face at night\\nCerave mineral sunscreen daily as needed
Detail Level: Detailed

## 2021-11-15 ENCOUNTER — RX ONLY (OUTPATIENT)
Age: 51
Setting detail: RX ONLY
End: 2021-11-15

## 2021-11-15 RX ORDER — KETOCONAZOLE 20 MG/ML
SHAMPOO, SUSPENSION TOPICAL
Qty: 120 | Refills: 5 | Status: ERX

## 2021-12-01 ENCOUNTER — APPOINTMENT (RX ONLY)
Dept: URBAN - METROPOLITAN AREA CLINIC 91 | Facility: CLINIC | Age: 51
Setting detail: DERMATOLOGY
End: 2021-12-01

## 2021-12-01 DIAGNOSIS — L21.8 OTHER SEBORRHEIC DERMATITIS: ICD-10-CM | Status: WELL CONTROLLED

## 2021-12-01 DIAGNOSIS — L71.8 OTHER ROSACEA: ICD-10-CM | Status: WELL CONTROLLED

## 2021-12-01 DIAGNOSIS — L72.8 OTHER FOLLICULAR CYSTS OF THE SKIN AND SUBCUTANEOUS TISSUE: ICD-10-CM

## 2021-12-01 PROCEDURE — ? PRESCRIPTION MEDICATION MANAGEMENT

## 2021-12-01 PROCEDURE — 99214 OFFICE O/P EST MOD 30 MIN: CPT | Mod: 25

## 2021-12-01 PROCEDURE — ? COUNSELING

## 2021-12-01 PROCEDURE — ? INTRALESIONAL KENALOG

## 2021-12-01 PROCEDURE — 11900 INJECT SKIN LESIONS </W 7: CPT

## 2021-12-01 ASSESSMENT — LOCATION ZONE DERM
LOCATION ZONE: NOSE
LOCATION ZONE: FACE
LOCATION ZONE: SCALP

## 2021-12-01 ASSESSMENT — LOCATION DETAILED DESCRIPTION DERM
LOCATION DETAILED: NASAL SUPRATIP
LOCATION DETAILED: POSTERIOR MID-PARIETAL SCALP
LOCATION DETAILED: RIGHT INFERIOR MEDIAL MALAR CHEEK
LOCATION DETAILED: LEFT MEDIAL MALAR CHEEK

## 2021-12-01 ASSESSMENT — LOCATION SIMPLE DESCRIPTION DERM
LOCATION SIMPLE: POSTERIOR SCALP
LOCATION SIMPLE: RIGHT CHEEK
LOCATION SIMPLE: LEFT CHEEK
LOCATION SIMPLE: NOSE

## 2021-12-01 NOTE — PROCEDURE: INTRALESIONAL KENALOG
Total Volume Injected (Ccs- Only Use Numbers And Decimals): 0.3
X Size Of Lesion In Cm (Optional): 0
Detail Level: Detailed
Validate Note Data When Using Inventory: Yes
Kenalog Preparation: Kenalog
Administered By (Optional): Dr. Pascale Edwards
Treatment Number (Optional): 1
Ndc# For Kenalog Only: 9081-2386-12
Medical Necessity Clause: This procedure was medically necessary because the lesions that were treated were:
Concentration Of Solution Injected (Mg/Ml): 3.3
Consent: The risks of atrophy were reviewed with the patient.

## 2021-12-01 NOTE — PROCEDURE: PRESCRIPTION MEDICATION MANAGEMENT
Detail Level: Detailed
Render In Strict Bullet Format?: No
Continue Regimen: Protopic ointment 0.1% apply to face at night\\nCerave mineral sunscreen daily as needed
Continue Regimen: Ketoconazole shampoo 3 times a week as needed

## 2022-04-25 ENCOUNTER — APPOINTMENT (RX ONLY)
Dept: URBAN - METROPOLITAN AREA CLINIC 91 | Facility: CLINIC | Age: 52
Setting detail: DERMATOLOGY
End: 2022-04-25

## 2022-04-25 DIAGNOSIS — D49.2 NEOPLASM OF UNSPECIFIED BEHAVIOR OF BONE, SOFT TISSUE, AND SKIN: ICD-10-CM

## 2022-04-25 PROCEDURE — 11102 TANGNTL BX SKIN SINGLE LES: CPT

## 2022-04-25 PROCEDURE — ? BIOPSY BY SHAVE METHOD

## 2022-04-25 ASSESSMENT — LOCATION DETAILED DESCRIPTION DERM: LOCATION DETAILED: POSTERIOR MID-PARIETAL SCALP

## 2022-04-25 ASSESSMENT — LOCATION SIMPLE DESCRIPTION DERM: LOCATION SIMPLE: POSTERIOR SCALP

## 2022-04-25 ASSESSMENT — LOCATION ZONE DERM: LOCATION ZONE: SCALP

## 2022-04-25 NOTE — PROCEDURE: BIOPSY BY SHAVE METHOD
Size Of Lesion In Cm: 0.8
Hide Additional Anticipated Plan?: No
Validate Note Data (See Information Below): Yes
Curettage Text: The wound bed was treated with curettage after the biopsy was performed.
Dressing: bandage
Information: Selecting Yes will display possible errors in your note based on the variables you have selected. This validation is only offered as a suggestion for you. PLEASE NOTE THAT THE VALIDATION TEXT WILL BE REMOVED WHEN YOU FINALIZE YOUR NOTE. IF YOU WANT TO FAX A PRELIMINARY NOTE YOU WILL NEED TO TOGGLE THIS TO 'NO' IF YOU DO NOT WANT IT IN YOUR FAXED NOTE.
Biopsy Type: H and E
Detail Level: Detailed
Billing Type: [employer]
Type Of Destruction Used: Curettage
Electrodesiccation And Curettage Text: The wound bed was treated with electrodesiccation and curettage after the biopsy was performed.
Wound Care: Vaseline
Consent was obtained and risks were reviewed including but not limited to scarring, infection, bleeding, scabbing, incomplete removal, nerve damage and allergy to anesthesia.
X Size Of Lesion In Cm: 0
Anesthesia Volume In Cc (Will Not Render If 0): 0.5
Cryotherapy Text: The wound bed was treated with cryotherapy after the biopsy was performed.
Post-care instructions reviewed with the patient in detail. The patient is to keep the biopsy site dry overnight. Remove the bandage and shower as normal with soap and water, dry the area, apply vaseline and a band-aid. Repeat this process daily for five days.
Hemostasis: Electrocautery
Silver Nitrate Text: The wound bed was treated with silver nitrate after the biopsy was performed.
Anesthesia Type: 1% lidocaine with epinephrine
Biopsy Method: 15 blade
Depth Of Biopsy: dermis
Notification Instructions: Patient will be notified of biopsy results. However, patient instructed to call the office if not contacted within 2 weeks.
Electrodesiccation Text: The wound bed was treated with electrodesiccation after the biopsy was performed.

## 2022-06-06 ENCOUNTER — APPOINTMENT (RX ONLY)
Dept: URBAN - METROPOLITAN AREA CLINIC 91 | Facility: CLINIC | Age: 52
Setting detail: DERMATOLOGY
End: 2022-06-06

## 2022-06-06 PROBLEM — C44.41 BASAL CELL CARCINOMA OF SKIN OF SCALP AND NECK: Status: ACTIVE | Noted: 2022-06-06

## 2022-06-06 PROCEDURE — 13121 CMPLX RPR S/A/L 2.6-7.5 CM: CPT

## 2022-06-06 PROCEDURE — 17311 MOHS 1 STAGE H/N/HF/G: CPT

## 2022-06-06 PROCEDURE — ? MOHS SURGERY

## 2022-06-06 PROCEDURE — ? PRESCRIPTION

## 2022-06-06 PROCEDURE — ? PATHOLOGY DISCUSSION

## 2022-06-06 PROCEDURE — ? COUNSELING

## 2022-06-06 RX ORDER — MUPIROCIN 20 MG/G
OINTMENT TOPICAL
Qty: 22 | Refills: 3 | Status: ERX | COMMUNITY
Start: 2022-06-06 | End: 2022-08-08

## 2022-06-06 RX ORDER — CEPHALEXIN 500 MG/1
CAPSULE ORAL
Qty: 21 | Refills: 0 | Status: ERX | COMMUNITY
Start: 2022-06-06

## 2022-06-06 RX ADMIN — CEPHALEXIN: 500 CAPSULE ORAL at 00:00

## 2022-06-06 RX ADMIN — MUPIROCIN: 20 OINTMENT TOPICAL at 00:00

## 2022-06-06 NOTE — PROCEDURE: MOHS SURGERY
Quadrant Reporting?: no
Mustarde Flap Text: The defect edges were debeveled with a #15 scalpel blade. Given the size, depth and location of the defect and the proximity to free margins a Mustarde flap was deemed most appropriate. Using a sterile surgical marker, an appropriate flap was drawn incorporating the defect. The area thus outlined was incised with a #15 scalpel blade. The skin margins were undermined to an appropriate distance in all directions utilizing iris scissors.
Hatchet Flap Text: The defect edges were debeveled with a #15 scalpel blade. Given the location of the defect, shape of the defect and the proximity to free margins a hatchet flap was deemed most appropriate. Using a sterile surgical marker, an appropriate hatchet flap was drawn incorporating the defect and placing the expected incisions within the relaxed skin tension lines where possible. The area thus outlined was incised deep to adipose tissue with a #15 scalpel blade. The skin margins were undermined to an appropriate distance in all directions utilizing iris scissors.
Stage 5: Additional Anesthesia Type: 1% lidocaine with epinephrine
Complex Repair And Flap Additional Text (Will Appearing After The Standard Complex Repair Text): The complex repair was not sufficient to completely close the primary defect. The remaining additional defect was repaired with the flap mentioned below.
Include Anticoagulation In Mohs Note?: Please Select the Appropriate Response
Double O-Z Flap Text: The defect edges were debeveled with a #15 scalpel blade. Given the location of the defect, shape of the defect and the proximity to free margins a Double O-Z flap was deemed most appropriate. Using a sterile surgical marker, an appropriate transposition flap was drawn incorporating the defect and placing the expected incisions within the relaxed skin tension lines where possible. The area thus outlined was incised deep to adipose tissue with a #15 scalpel blade. The skin margins were undermined to an appropriate distance in all directions utilizing iris scissors.
Xenograft Text: The defect edges were debeveled with a #15 scalpel blade. Given the location of the defect, shape of the defect and the proximity to free margins a xenograft was deemed most appropriate. The graft was then trimmed to fit the size of the defect. The graft was then placed in the primary defect and oriented appropriately.
Hemigard Postcare Instructions: The HEMIGARD strips are to remain completely dry for at least 5-7 days.
Burow's Graft Text: The defect edges were debeveled with a #15 scalpel blade. Given the location of the defect, shape of the defect, the proximity to free margins and the presence of a standing cone deformity a Burow's skin graft was deemed most appropriate. The standing cone was removed and this tissue was then trimmed to the shape of the primary defect. The adipose tissue was also removed until only dermis and epidermis were left. The skin margins of the secondary defect were undermined to an appropriate distance in all directions utilizing iris scissors. The secondary defect was closed with interrupted buried subcutaneous sutures. The skin edges were then re-apposed with running  sutures. The skin graft was then placed in the primary defect and oriented appropriately.
Bcc  Nodular Histology Text: There was an atypical proliferation of small cuboidal cells with large nuclear/cytoplasmic ratio, basophilic cytoplasm, and hyperchromatic nuclei in a nodular pattern.
Show Quadrant Variables In The Stage Tabs: Yes
Burow's Advancement Flap Text: The defect edges were debeveled with a #15 scalpel blade. Given the location of the defect and the proximity to free margins a Burow's advancement flap was deemed most appropriate. Using a sterile surgical marker, the appropriate advancement flap was drawn incorporating the defect and placing the expected incisions within the relaxed skin tension lines where possible. The area thus outlined was incised deep to adipose tissue with a #15 scalpel blade. The skin margins were undermined to an appropriate distance in all directions utilizing iris scissors.
Quadrants Reporting?: 0
Keystone Flap Text: The defect edges were debeveled with a #15 scalpel blade. Given the location of the defect, shape of the defect a keystone flap was deemed most appropriate. Using a sterile surgical marker, an appropriate keystone flap was drawn incorporating the defect, outlining the appropriate donor tissue and placing the expected incisions within the relaxed skin tension lines where possible. The area thus outlined was incised deep to adipose tissue with a #15 scalpel blade. The skin margins were undermined to an appropriate distance in all directions around the primary defect and laterally outward around the flap utilizing iris scissors.
Referred To Plastics For Closure Text (Leave Blank If You Do Not Want): After obtaining clear surgical margins the patient was sent to plastics for surgical repair. The patient understands they will receive post-surgical care and follow-up from the referring physician's office.
Trilobed Flap Text: The defect edges were debeveled with a #15 scalpel blade. Given the location of the defect and the proximity to free margins a trilobed flap was deemed most appropriate. Using a sterile surgical marker, an appropriate trilobed flap drawn around the defect. The area thus outlined was incised deep to adipose tissue with a #15 scalpel blade. The skin margins were undermined to an appropriate distance in all directions utilizing iris scissors.
Mid-Level Procedure Text (B): After obtaining clear surgical margins the patient was sent to a mid-level provider for surgical repair. The patient understands they will receive post-surgical care and follow-up from the mid-level provider.
Mart-1 - Negative Histology Text: MART-1 staining demonstrates a normal density and pattern of melanocytes along the dermal-epidermal junction. The surgical margins are negative for tumor cells.
Bi-Rhombic Flap Text: The defect edges were debeveled with a #15 scalpel blade. Given the location of the defect and the proximity to free margins a bi-rhombic flap was deemed most appropriate. Using a sterile surgical marker, an appropriate rhombic flap was drawn incorporating the defect. The area thus outlined was incised deep to adipose tissue with a #15 scalpel blade. The skin margins were undermined to an appropriate distance in all directions utilizing iris scissors.
Provider Procedure Text (D): After obtaining clear surgical margins the defect was repaired by another provider.
Oculoplastic Surgeon Procedure Text (A): After obtaining clear surgical margins the patient was sent to oculoplastics for surgical repair. The patient understands they will receive post-surgical care and follow-up from the referring physician's office.
Cheiloplasty (Complex) Text: A decision was made to reconstruct the defect with a  cheiloplasty. The defect was undermined extensively. Additional obicularis oris muscle was excised with a 15 blade scalpel. The defect was converted into a full thickness wedge to facilite a better cosmetic result. Small vessels were then tied off with 5-0 monocyrl. The obicularis oris, superficial fascia, adipose and dermis were then reapproximated. After the deeper layers were approximated the epidermis was reapproximated with particular care given to realign the vermillion border.
Mid-Level Procedure Text (E): After obtaining clear surgical margins the patient was sent to a mid-level provider for surgical repair.  The patient understands they will receive post-surgical care and follow-up from the mid-level provider.
Special Stains Stage 13 - Results: Base On Clearance Noted Above
Plastic Surgeon Procedure Text (D): After obtaining clear surgical margins the patient was sent to plastics for surgical repair.  The patient understands they will receive post-surgical care and follow-up from the referring physician's office.
Cheek-To-Nose Interpolation Flap Text: A decision was made to reconstruct the defect utilizing an interpolation axial flap and a staged reconstruction. A telfa template was made of the defect. This telfa template was then used to outline the Cheek-To-Nose Interpolation flap. The donor area for the pedicle flap was then injected with anesthesia. The flap was excised through the skin and subcutaneous tissue down to the layer of the underlying musculature. The interpolation flap was carefully excised within this deep plane to maintain its blood supply. The edges of the donor site were undermined. The donor site was closed in a primary fashion. The pedicle was then rotated into position and sutured. Once the tube was sutured into place, adequate blood supply was confirmed with blanching and refill. The pedicle was then wrapped with xeroform gauze and dressed appropriately with a telfa and gauze bandage to ensure continued blood supply and protect the attached pedicle.
Number Of Hemigard Strips Per Side: 1
Length To Time In Minutes Device Was In Place: 10
Surgical Defect Length In Cm (Optional): 2.1
M-Plasty Complex Repair Preamble Text (Leave Blank If You Do Not Want): Extensive wide undermining was performed.
Consent (Nose)/Introductory Paragraph: The rationale for Mohs was explained to the patient and consent was obtained. The risks, benefits and alternatives to therapy were discussed in detail. Specifically, the risks of nasal deformity, changes in the flow of air through the nose, infection, scarring, bleeding, prolonged wound healing, incomplete removal, allergy to anesthesia, nerve injury and recurrence were addressed. Prior to the procedure, the treatment site was clearly identified and confirmed by the patient. All components of Universal Protocol/PAUSE Rule completed.
Consent 2/Introductory Paragraph: Mohs surgery was explained to the patient and consent was obtained. The risks, benefits and alternatives to therapy were discussed in detail. Specifically, the risks of infection, scarring, bleeding, prolonged wound healing, incomplete removal, allergy to anesthesia, nerve injury and recurrence were addressed. Prior to the procedure, the treatment site was clearly identified and confirmed by the patient. All components of Universal Protocol/PAUSE Rule completed.
Double Island Pedicle Flap Text: The defect edges were debeveled with a #15 scalpel blade. Given the location of the defect, shape of the defect and the proximity to free margins a double island pedicle advancement flap was deemed most appropriate. Using a sterile surgical marker, an appropriate advancement flap was drawn incorporating the defect, outlining the appropriate donor tissue and placing the expected incisions within the relaxed skin tension lines where possible. The area thus outlined was incised deep to adipose tissue with a #15 scalpel blade. The skin margins were undermined to an appropriate distance in all directions around the primary defect and laterally outward around the island pedicle utilizing iris scissors. There was minimal undermining beneath the pedicle flap.
Referred To Oculoplastics For Closure Text (Leave Blank If You Do Not Want): After obtaining clear surgical margins the patient was sent to oculoplastics for surgical repair.  The patient understands they will receive post-surgical care and follow-up from the referring physician's office.
Inflammation Suggestive Of Cancer Camouflage Histology Text: There was a dense lymphocytic infiltrate which prevented adequate histologic evaluation of adjacent structures.
Pain Refusal Text: I offered to prescribe pain medication but the patient refused to take this medication.
Repair Type: Complex Repair
Bilobed Flap Text: The defect edges were debeveled with a #15 scalpel blade. Given the location of the defect and the proximity to free margins a bilobe flap was deemed most appropriate. Using a sterile surgical marker, an appropriate bilobe flap drawn around the defect. The area thus outlined was incised deep to adipose tissue with a #15 scalpel blade. The skin margins were undermined to an appropriate distance in all directions utilizing iris scissors.
Rhombic Flap Text: The defect edges were debeveled with a #15 scalpel blade. Given the location of the defect and the proximity to free margins a rhombic flap was deemed most appropriate. Using a sterile surgical marker, an appropriate rhombic flap was drawn incorporating the defect. The area thus outlined was incised deep to adipose tissue with a #15 scalpel blade. The skin margins were undermined to an appropriate distance in all directions utilizing iris scissors.
Closure 2 Information: This tab is for additional flaps and grafts, including complex repair and grafts and complex repair and flaps. You can also specify a different location for the additional defect, if the location is the same you do not need to select a new one. We will insert the automated text for the repair you select below just as we do for solitary flaps and grafts. Please note that at this time if you select a location with a different insurance zone you will need to override the ICD10 and CPT if appropriate.
Transposition Flap Text: The defect edges were debeveled with a #15 scalpel blade. Given the location of the defect and the proximity to free margins a transposition flap was deemed most appropriate. Using a sterile surgical marker, an appropriate transposition flap was drawn incorporating the defect. The area thus outlined was incised deep to adipose tissue with a #15 scalpel blade. The skin margins were undermined to an appropriate distance in all directions utilizing iris scissors.
Mucosal Advancement Flap Text: Given the location of the defect, shape of the defect and the proximity to free margins a mucosal advancement flap was deemed most appropriate. Incisions were made with a 15 blade scalpel in the appropriate fashion along the cutaneous vermillion border and the mucosal lip. The remaining actinically damaged mucosal tissue was excised. The mucosal advancement flap was then elevated to the gingival sulcus with care taken to preserve the neurovascular structures and advanced into the primary defect. Care was taken to ensure that precise realignment of the vermillion border was achieved.
Asc Procedure Text (E): After obtaining clear surgical margins the patient was sent to an [facility] for surgical repair. The patient understands they will receive post-surgical care and follow-up from the [facility] physician.
Helical Rim Text: The closure involved the helical rim.
Eye Protection Verbiage: Before proceeding with the stage, a plastic scleral shield was inserted. The globe was anesthetized with a few drops of 1% lidocaine with 1:100,000 epinephrine. Then, an appropriate sized scleral shield was chosen and coated with lacrilube ointment. The shield was gently inserted and left in place for the duration of each stage. After the stage was completed, the shield was gently removed.
Tumor Debulked?: not debulked
O-L Flap Text: The defect edges were debeveled with a #15 scalpel blade. Given the location of the defect, shape of the defect and the proximity to free margins an O-L flap was deemed most appropriate. Using a sterile surgical marker, an appropriate advancement flap was drawn incorporating the defect and placing the expected incisions within the relaxed skin tension lines where possible. The area thus outlined was incised deep to adipose tissue with a #15 scalpel blade. The skin margins were undermined to an appropriate distance in all directions utilizing iris scissors.
H Plasty Text: Given the location of the defect, shape of the defect and the proximity to free margins a H-plasty was deemed most appropriate for repair. Using a sterile surgical marker, the appropriate advancement arms of the H-plasty were drawn incorporating the defect and placing the expected incisions within the relaxed skin tension lines where possible. The area thus outlined was incised deep to adipose tissue with a #15 scalpel blade. The skin margins were undermined to an appropriate distance in all directions utilizing iris scissors. The opposing advancement arms were then advanced into place in opposite direction and anchored with interrupted buried subcutaneous sutures.
Advancement Flap (Single) Text: The defect edges were debeveled with a #15 scalpel blade. Given the location of the defect and the proximity to free margins a single advancement flap was deemed most appropriate. Using a sterile surgical marker, an appropriate advancement flap was drawn incorporating the defect and placing the expected incisions within the relaxed skin tension lines where possible. The area thus outlined was incised deep to adipose tissue with a #15 scalpel blade. The skin margins were undermined to an appropriate distance in all directions utilizing iris scissors.
Epidermal Closure Graft Donor Site (Optional): simple interrupted
Consent (Near Eyelid Margin)/Introductory Paragraph: The rationale for Mohs was explained to the patient and consent was obtained. The risks, benefits and alternatives to therapy were discussed in detail. Specifically, the risks of ectropion or eyelid deformity, infection, scarring, bleeding, prolonged wound healing, incomplete removal, allergy to anesthesia, nerve injury and recurrence were addressed. Prior to the procedure, the treatment site was clearly identified and confirmed by the patient. All components of Universal Protocol/PAUSE Rule completed.
Otolaryngologist Procedure Text (B): After obtaining clear surgical margins the patient was sent to otolaryngology for surgical repair. The patient understands they will receive post-surgical care and follow-up from the referring physician's office.
Debridement Text: The wound edges were debrided prior to proceeding with the closure to facilitate wound healing.
Alternatives Discussed Intro (Do Not Add Period): I discussed alternative treatments to Mohs surgery and specifically discussed the risks and benefits of
Scc Poorly Differentiated Histology Text: There were irregular masses of atypical cells proliferating downward in discontiguous islands detached from the overlying epidermis- poorly differentiated.
Anesthesia Volume In Cc: 5
Localized Dermabrasion Text: The patient was draped in routine manner. Localized dermabrasion using 3 x 17 mm wire brush was performed in routine manner to papillary dermis. This spot dermabrasion is being performed to complete skin cancer reconstruction. It also will eliminate the other sun damaged precancerous cells that are known to be part of the regional effect of a lifetime's worth of sun exposure. This localized dermabrasion is therapeutic and should not be considered cosmetic in any regard.
Skin Substitute Text: The defect edges were debeveled with a #15 scalpel blade. Given the location of the defect, shape of the defect and the proximity to free margins a skin substitute graft was deemed most appropriate. The graft material was trimmed to fit the size of the defect. The graft was then placed in the primary defect and oriented appropriately.
Suturegard Body: The suture ends were repeatedly re-tightened and re-clamped to achieve the desired tissue expansion.
Staging Info: By selecting yes to the question above you will include information on AJCC 8 tumor staging in your Mohs note. Information on tumor staging will be automatically added for SCCs on the head and neck. AJCC 8 includes tumor size, tumor depth, perineural involvement and bone invasion.
Double M-Plasty Intermediate Repair Preamble Text (Leave Blank If You Do Not Want): Undermining was performed with blunt dissection.
Area M Indication Text: Tumors in this location are included in Area M (cheek, forehead, scalp, neck, jawline and pretibial skin). Mohs surgery is indicated for tumors in these anatomic locations.
Bcc Superficial Histology Text: There were small buds of deeply basophilic cells that extended from the epidermis into the superficial dermis.
A-T Advancement Flap Text: The defect edges were debeveled with a #15 scalpel blade. Given the location of the defect, shape of the defect and the proximity to free margins an A-T advancement flap was deemed most appropriate. Using a sterile surgical marker, an appropriate advancement flap was drawn incorporating the defect and placing the expected incisions within the relaxed skin tension lines where possible. The area thus outlined was incised deep to adipose tissue with a #15 scalpel blade. The skin margins were undermined to an appropriate distance in all directions utilizing iris scissors.
Double O-Z Plasty Text: The defect edges were debeveled with a #15 scalpel blade. Given the location of the defect, shape of the defect and the proximity to free margins a Double O-Z plasty (double transposition flap) was deemed most appropriate. Using a sterile surgical marker, the appropriate transposition flaps were drawn incorporating the defect and placing the expected incisions within the relaxed skin tension lines where possible. The area thus outlined was incised deep to adipose tissue with a #15 scalpel blade. The skin margins were undermined to an appropriate distance in all directions utilizing iris scissors. Hemostasis was achieved with electrocautery. The flaps were then transposed into place, one clockwise and the other counterclockwise, and anchored with interrupted buried subcutaneous sutures.
Island Pedicle Flap With Canthal Suspension Text: The defect edges were debeveled with a #15 scalpel blade. Given the location of the defect, shape of the defect and the proximity to free margins an island pedicle advancement flap was deemed most appropriate. Using a sterile surgical marker, an appropriate advancement flap was drawn incorporating the defect, outlining the appropriate donor tissue and placing the expected incisions within the relaxed skin tension lines where possible. The area thus outlined was incised deep to adipose tissue with a #15 scalpel blade. The skin margins were undermined to an appropriate distance in all directions around the primary defect and laterally outward around the island pedicle utilizing iris scissors. There was minimal undermining beneath the pedicle flap. A suspension suture was placed in the canthal tendon to prevent tension and prevent ectropion.
Same Histology In Subsequent Stages Text: The pattern and morphology of the tumor is as described in the first stage.
Secondary Intention Text (Leave Blank If You Do Not Want): The defect will heal with secondary intention.
Ear Star Wedge Flap Text: The defect edges were debeveled with a #15 blade scalpel. Given the location of the defect and the proximity to free margins (helical rim) an ear star wedge flap was deemed most appropriate. Using a sterile surgical marker, the appropriate flap was drawn incorporating the defect and placing the expected incisions between the helical rim and antihelix where possible. The area thus outlined was incised through and through with a #15 scalpel blade.
Basosquamous Cell Carcinoma Histology Text: There were atypical keratinocytes and basaloid cells, large rounded basaloid cells, and squamous cells with polygonal squamoid cells, with abundant eosinophilic cytoplasm, large nuclei and prominent nucleoli.
Paramedian Forehead Flap Text: A decision was made to reconstruct the defect utilizing an interpolation axial flap and a staged reconstruction. A telfa template was made of the defect. This telfa template was then used to outline the paramedian forehead pedicle flap. The donor area for the pedicle flap was then injected with anesthesia. The flap was excised through the skin and subcutaneous tissue down to the layer of the underlying musculature. The pedicle flap was carefully excised within this deep plane to maintain its blood supply. The edges of the donor site were undermined. The donor site was closed in a primary fashion. The pedicle was then rotated into position and sutured. Once the tube was sutured into place, adequate blood supply was confirmed with blanching and refill. The pedicle was then wrapped with xeroform gauze and dressed appropriately with a telfa and gauze bandage to ensure continued blood supply and protect the attached pedicle.
Dressing (No Sutures): dry sterile dressing
Zygomaticofacial Flap Text: Given the location of the defect, shape of the defect and the proximity to free margins a zygomaticofacial flap was deemed most appropriate for repair. Using a sterile surgical marker, the appropriate flap was drawn incorporating the defect and placing the expected incisions within the relaxed skin tension lines where possible. The area thus outlined was incised deep to adipose tissue with a #15 scalpel blade with preservation of a vascular pedicle. The skin margins were undermined to an appropriate distance in all directions utilizing iris scissors. The flap was then placed into the defect and anchored with interrupted buried subcutaneous sutures.
Asc Procedure Text (C): After obtaining clear surgical margins the patient was sent to an ASC for surgical repair.  The patient understands they will receive post-surgical care and follow-up from the ASC physician.
Home Suture Removal Text: Patient was provided instructions on removing sutures and will remove their sutures at home. If they have any questions or difficulties they will call the office.
Closure 4 Information: This tab is for additional flaps and grafts above and beyond our usual structured repairs. Please note if you enter information here it will not currently bill and you will need to add the billing information manually.
Initial Size Of Lesion: 1.8
Detail Level: Detailed
Consent (Marginal Mandibular)/Introductory Paragraph: The rationale for Mohs was explained to the patient and consent was obtained. The risks, benefits and alternatives to therapy were discussed in detail. Specifically, the risks of damage to the marginal mandibular branch of the facial nerve, infection, scarring, bleeding, prolonged wound healing, incomplete removal, allergy to anesthesia, and recurrence were addressed. Prior to the procedure, the treatment site was clearly identified and confirmed by the patient. All components of Universal Protocol/PAUSE Rule completed.
Otolaryngologist Procedure Text (E): After obtaining clear surgical margins the patient was sent to otolaryngology for surgical repair.  The patient understands they will receive post-surgical care and follow-up from the referring physician's office.
Orbicularis Oris Muscle Flap Text: The defect edges were debeveled with a #15 scalpel blade. Given that the defect affected the competency of the oral sphincter an obicularis oris muscle flap was deemed most appropriate to restore this competency and normal muscle function. Using a sterile surgical marker, an appropriate flap was drawn incorporating the defect. The area thus outlined was incised with a #15 scalpel blade.
Staged Advancement Flap Text: The defect edges were debeveled with a #15 scalpel blade. Given the location of the defect, shape of the defect and the proximity to free margins a staged advancement flap was deemed most appropriate. Using a sterile surgical marker, an appropriate advancement flap was drawn incorporating the defect and placing the expected incisions within the relaxed skin tension lines where possible. The area thus outlined was incised deep to adipose tissue with a #15 scalpel blade. The skin margins were undermined to an appropriate distance in all directions utilizing iris scissors.
Mohs Case Number: 
Partial Purse String (Simple) Text: Given the location of the defect and the characteristics of the surrounding skin a simple purse string closure was deemed most appropriate. Undermining was performed circumfirentially around the surgical defect. A purse string suture was then placed and tightened. Wound tension only allowed a partial closure of the circular defect.
Mercedes Flap Text: The defect edges were debeveled with a #15 scalpel blade. Given the location of the defect, shape of the defect and the proximity to free margins a Mercedes flap was deemed most appropriate. Using a sterile surgical marker, an appropriate advancement flap was drawn incorporating the defect and placing the expected incisions within the relaxed skin tension lines where possible. The area thus outlined was incised deep to adipose tissue with a #15 scalpel blade. The skin margins were undermined to an appropriate distance in all directions utilizing iris scissors.
Epidermal Autograft Text: The defect edges were debeveled with a #15 scalpel blade. Given the location of the defect, shape of the defect and the proximity to free margins an epidermal autograft was deemed most appropriate. Using a sterile surgical marker, the primary defect shape was transferred to the donor site. The epidermal graft was then harvested. The skin graft was then placed in the primary defect and oriented appropriately.
Retention Suture Bite Size: 3 mm
Mohs Histo Method Verbiage: Each section was then chromacoded and processed in the Mohs lab using the Mohs protocol and submitted for frozen section.
Estimated Blood Loss (Cc): minimal
Bcc Infiltrative Histology Text: There was an atypical proliferation of small cuboidal cells with large nuclear/cytoplasmic ratio, basophilic cytoplasm, and hyperchromatic nuclei in an infiltrative  pattern.
Chonodrocutaneous Helical Advancement Flap Text: The defect edges were debeveled with a #15 scalpel blade. Given the location of the defect and the proximity to free margins a chondrocutaneous helical advancement flap was deemed most appropriate. Using a sterile surgical marker, the appropriate advancement flap was drawn incorporating the defect and placing the expected incisions within the relaxed skin tension lines where possible. The area thus outlined was incised deep to adipose tissue with a #15 scalpel blade. The skin margins were undermined to an appropriate distance in all directions utilizing iris scissors.
Ftsg Text: The defect edges were debeveled with a #15 scalpel blade. Given the location of the defect, shape of the defect and the proximity to free margins a full thickness skin graft was deemed most appropriate. Using a sterile surgical marker, the primary defect shape was transferred to the donor site. The area thus outlined was incised deep to adipose tissue with a #15 scalpel blade. The harvested graft was then trimmed of adipose tissue until only dermis and epidermis was left. The skin margins of the secondary defect were undermined to an appropriate distance in all directions utilizing iris scissors. The secondary defect was closed with interrupted buried subcutaneous sutures. The skin edges were then re-apposed with running  sutures. The skin graft was then placed in the primary defect and oriented appropriately.
Mastoid Interpolation Flap Text: A decision was made to reconstruct the defect utilizing an interpolation axial flap and a staged reconstruction. A telfa template was made of the defect. This telfa template was then used to outline the mastoid interpolation flap. The donor area for the pedicle flap was then injected with anesthesia. The flap was excised through the skin and subcutaneous tissue down to the layer of the underlying musculature. The pedicle flap was carefully excised within this deep plane to maintain its blood supply. The edges of the donor site were undermined. The donor site was closed in a primary fashion. The pedicle was then rotated into position and sutured. Once the tube was sutured into place, adequate blood supply was confirmed with blanching and refill. The pedicle was then wrapped with xeroform gauze and dressed appropriately with a telfa and gauze bandage to ensure continued blood supply and protect the attached pedicle.
Hemigard Retention Suture: 2-0 Nylon
Closure 3 Information: This tab is for additional flaps and grafts above and beyond our usual structured repairs.  Please note if you enter information here it will not currently bill and you will need to add the billing information manually.
Mart-1 - Positive Histology Text: MART-1 staining demonstrates areas of higher density and clustering of melanocytes with Pagetoid spread upwards within the epidermis. The surgical margins are positive for tumor cells.
Complex Repair And Graft Additional Text (Will Appearing After The Standard Complex Repair Text): The complex repair was not sufficient to completely close the primary defect. The remaining additional defect was repaired with the graft mentioned below.
Purse String (Simple) Text: Given the location of the defect and the characteristics of the surrounding skin a pursestring closure was deemed most appropriate. Undermining was performed circumfirentially around the surgical defect. A purstring suture was then placed and tightened.
V-Y Flap Text: The defect edges were debeveled with a #15 scalpel blade. Given the location of the defect, shape of the defect and the proximity to free margins a V-Y flap was deemed most appropriate. Using a sterile surgical marker, an appropriate advancement flap was drawn incorporating the defect and placing the expected incisions within the relaxed skin tension lines where possible. The area thus outlined was incised deep to adipose tissue with a #15 scalpel blade. The skin margins were undermined to an appropriate distance in all directions utilizing iris scissors.
Donor Site Anesthesia Type: same as repair anesthesia
Cartilage Graft Text: The defect edges were debeveled with a #15 scalpel blade. Given the location of the defect, shape of the defect, the fact the defect involved a full thickness cartilage defect a cartilage graft was deemed most appropriate. An appropriate donor site was identified, cleansed, and anesthetized. The cartilage graft was then harvested and transferred to the recipient site, oriented appropriately and then sutured into place. The secondary defect was then repaired using a primary closure.
Post-care instructions reviewed with the patient in detail. The patient is to keep the excision site dry for 48 hours. Remove the bandage and shower as normal with soap and water, dry the area, apply vaseline and a band-aid. Repeat this process daily until the suture removal appointment. Patient is not to engage in any heavy lifting, exercise, or swimming until suture removal. Should the patient develop any fevers, chills, bleeding, severe pain patient will contact the office immediately.
O-T Plasty Text: The defect edges were debeveled with a #15 scalpel blade. Given the location of the defect, shape of the defect and the proximity to free margins an O-T plasty was deemed most appropriate. Using a sterile surgical marker, an appropriate O-T plasty was drawn incorporating the defect and placing the expected incisions within the relaxed skin tension lines where possible. The area thus outlined was incised deep to adipose tissue with a #15 scalpel blade. The skin margins were undermined to an appropriate distance in all directions utilizing iris scissors.
Dorsal Nasal Flap Text: The defect edges were debeveled with a #15 scalpel blade. Given the location of the defect and the proximity to free margins a dorsal nasal flap was deemed most appropriate. Using a sterile surgical marker, an appropriate dorsal nasal flap was drawn around the defect. The area thus outlined was incised deep to adipose tissue with a #15 scalpel blade. The skin margins were undermined to an appropriate distance in all directions utilizing iris scissors.
Helical Rim Advancement Flap Text: The defect edges were debeveled with a #15 blade scalpel. Given the location of the defect and the proximity to free margins (helical rim) a double helical rim advancement flap was deemed most appropriate. Using a sterile surgical marker, the appropriate advancement flaps were drawn incorporating the defect and placing the expected incisions between the helical rim and antihelix where possible. The area thus outlined was incised through and through with a #15 scalpel blade. With a skin hook and iris scissors, the flaps were gently and sharply undermined and freed up.
Nasal Turnover Hinge Flap Text: The defect edges were debeveled with a #15 scalpel blade. Given the size, depth, location of the defect and the defect being full thickness a nasal turnover hinge flap was deemed most appropriate. Using a sterile surgical marker, an appropriate hinge flap was drawn incorporating the defect. The area thus outlined was incised with a #15 scalpel blade. The flap was designed to recreate the nasal mucosal lining and the alar rim. The skin margins were undermined to an appropriate distance in all directions utilizing iris scissors.
Rotation Flap Text: The defect edges were debeveled with a #15 scalpel blade. Given the location of the defect, shape of the defect and the proximity to free margins a rotation flap was deemed most appropriate. Using a sterile surgical marker, an appropriate rotation flap was drawn incorporating the defect and placing the expected incisions within the relaxed skin tension lines where possible. The area thus outlined was incised deep to adipose tissue with a #15 scalpel blade. The skin margins were undermined to an appropriate distance in all directions utilizing iris scissors.
Undermining Type: Entire Wound
Ear Wedge Repair Text: A wedge excision was completed by carrying down an excision through the full thickness of the ear and cartilage with an inward facing Burow's triangle. The wound was then closed in a layered fashion.
Interpolation Flap Text: A decision was made to reconstruct the defect utilizing an interpolation axial flap and a staged reconstruction. A telfa template was made of the defect. This telfa template was then used to outline the interpolation flap. The donor area for the pedicle flap was then injected with anesthesia. The flap was excised through the skin and subcutaneous tissue down to the layer of the underlying musculature. The interpolation flap was carefully excised within this deep plane to maintain its blood supply. The edges of the donor site were undermined. The donor site was closed in a primary fashion. The pedicle was then rotated into position and sutured. Once the tube was sutured into place, adequate blood supply was confirmed with blanching and refill. The pedicle was then wrapped with xeroform gauze and dressed appropriately with a telfa and gauze bandage to ensure continued blood supply and protect the attached pedicle.
Surgeon: Dr. Claudell Cairo
Scc Well Differentiated Histology Text: There were irregular masses of atypical cells proliferating downward in discontiguous islands detached from the overlying epidermis- well differentiated.
Mohs Method Verbiage: An incision at a 45 degree angle following the standard Mohs approach was done and the specimen was harvested as a microscopic controlled layer.
Brow Lift Text: A midfrontal incision was made medially to the defect to allow access to the tissues just superior to the left eyebrow. Following careful dissection inferiorly in a supraperiosteal plane to the level of the left eyebrow, several 3-0 monocryl sutures were used to resuspend the eyebrow orbicularis oculi muscular unit to the superior frontal bone periosteum. This resulted in an appropriate reapproximation of static eyebrow symmetry and correction of the left brow ptosis.
Consent (Lip)/Introductory Paragraph: The rationale for Mohs was explained to the patient and consent was obtained. The risks, benefits and alternatives to therapy were discussed in detail. Specifically, the risks of lip deformity, changes in the oral aperture, infection, scarring, bleeding, prolonged wound healing, incomplete removal, allergy to anesthesia, nerve injury and recurrence were addressed. Prior to the procedure, the treatment site was clearly identified and confirmed by the patient. All components of Universal Protocol/PAUSE Rule completed.
Subsequent Stages Histo Method Verbiage: Using a similar technique to that described above, a thin layer of tissue was removed from all areas where tumor was visible on the previous stage. The tissue was again oriented, mapped, dyed, and processed as above.
Consent 3/Introductory Paragraph: I gave the patient a chance to ask questions they had about the procedure. Following this I explained the Mohs procedure and consent was obtained. The risks, benefits and alternatives to therapy were discussed in detail. Specifically, the risks of infection, scarring, bleeding, prolonged wound healing, incomplete removal, allergy to anesthesia, nerve injury and recurrence were addressed. Prior to the procedure, the treatment site was clearly identified and confirmed by the patient. All components of Universal Protocol/PAUSE Rule completed.
Wound Care (No Sutures): Petrolatum
Body Location Override (Optional - Billing Will Still Be Based On Selected Body Map Location If Applicable): posterior mid parietal scalp
Rhomboid Transposition Flap Text: The defect edges were debeveled with a #15 scalpel blade. Given the location of the defect and the proximity to free margins a rhomboid transposition flap was deemed most appropriate. Using a sterile surgical marker, an appropriate rhomboid flap was drawn incorporating the defect. The area thus outlined was incised deep to adipose tissue with a #15 scalpel blade. The skin margins were undermined to an appropriate distance in all directions utilizing iris scissors.
Wound Care: Vaseline
Muscle Hinge Flap Text: The defect edges were debeveled with a #15 scalpel blade. Given the size, depth and location of the defect and the proximity to free margins a muscle hinge flap was deemed most appropriate. Using a sterile surgical marker, an appropriate hinge flap was drawn incorporating the defect. The area thus outlined was incised with a #15 scalpel blade. The skin margins were undermined to an appropriate distance in all directions utilizing iris scissors.
Peng Advancement Flap Text: The defect edges were debeveled with a #15 scalpel blade. Given the location of the defect, shape of the defect and the proximity to free margins a Peng advancement flap was deemed most appropriate. Using a sterile surgical marker, an appropriate advancement flap was drawn incorporating the defect and placing the expected incisions within the relaxed skin tension lines where possible. The area thus outlined was incised deep to adipose tissue with a #15 scalpel blade. The skin margins were undermined to an appropriate distance in all directions utilizing iris scissors.
Tarsorrhaphy Text: A tarsorrhaphy was performed using Frost sutures.
O-Z Flap Text: The defect edges were debeveled with a #15 scalpel blade. Given the location of the defect, shape of the defect and the proximity to free margins an O-Z flap was deemed most appropriate. Using a sterile surgical marker, an appropriate transposition flap was drawn incorporating the defect and placing the expected incisions within the relaxed skin tension lines where possible. The area thus outlined was incised deep to adipose tissue with a #15 scalpel blade. The skin margins were undermined to an appropriate distance in all directions utilizing iris scissors.
Tissue Cultured Epidermal Autograft Text: The defect edges were debeveled with a #15 scalpel blade. Given the location of the defect, shape of the defect and the proximity to free margins a tissue cultured epidermal autograft was deemed most appropriate. The graft was then trimmed to fit the size of the defect. The graft was then placed in the primary defect and oriented appropriately.
Hemigard Intro: Due to skin fragility and wound tension, it was decided to use HEMIGARD adhesive retention suture devices to permit a linear closure. The skin was cleaned and dried for a 6cm distance away from the wound. Excessive hair, if present, was removed to allow for adhesion.
Vermilion Border Text: The closure involved the vermilion border.
Graft Donor Site Epidermal Sutures (Optional): 5-0 Nylon
Bcc  Morpheaform/Sclerosing Histology Text: There were atypical linear and branching aggregates of tumor cells embedded in a fibrotic dermis.
W Plasty Text: The lesion was extirpated to the level of the fat with a #15 scalpel blade. Given the location of the defect, shape of the defect and the proximity to free margins a W-plasty was deemed most appropriate for repair. Using a sterile surgical marker, the appropriate transposition arms of the W-plasty were drawn incorporating the defect and placing the expected incisions within the relaxed skin tension lines where possible. The area thus outlined was incised deep to adipose tissue with a #15 scalpel blade. The skin margins were undermined to an appropriate distance in all directions utilizing iris scissors. The opposing transposition arms were then transposed into place in opposite direction and anchored with interrupted buried subcutaneous sutures.
Advancement Flap (Double) Text: The defect edges were debeveled with a #15 scalpel blade. Given the location of the defect and the proximity to free margins a double advancement flap was deemed most appropriate. Using a sterile surgical marker, the appropriate advancement flaps were drawn incorporating the defect and placing the expected incisions within the relaxed skin tension lines where possible. The area thus outlined was incised deep to adipose tissue with a #15 scalpel blade. The skin margins were undermined to an appropriate distance in all directions utilizing iris scissors.
Bcc Histology Text: There were numerous aggregates of basaloid cells.
Island Pedicle Flap-Requiring Vessel Identification Text: The defect edges were debeveled with a #15 scalpel blade. Given the location of the defect, shape of the defect and the proximity to free margins an island pedicle advancement flap was deemed most appropriate. Using a sterile surgical marker, an appropriate advancement flap was drawn, based on the axial vessel mentioned above, incorporating the defect, outlining the appropriate donor tissue and placing the expected incisions within the relaxed skin tension lines where possible. The area thus outlined was incised deep to adipose tissue with a #15 scalpel blade. The skin margins were undermined to an appropriate distance in all directions around the primary defect and laterally outward around the island pedicle utilizing iris scissors. There was minimal undermining beneath the pedicle flap.
Mauc Instructions: By selecting yes to the question below the [facility] number will be added into the note. This will be calculated automatically based on the diagnosis chosen, the size entered, the body zone selected (H,M,L) and the specific indications you chose. You will also have the option to override the Mohs AUC if you disagree with the automatically calculated number and this option is found in the Case Summary tab.
Bilobed Transposition Flap Text: The defect edges were debeveled with a #15 scalpel blade. Given the location of the defect and the proximity to free margins a bilobed transposition flap was deemed most appropriate. Using a sterile surgical marker, an appropriate bilobe flap drawn around the defect. The area thus outlined was incised deep to adipose tissue with a #15 scalpel blade. The skin margins were undermined to an appropriate distance in all directions utilizing iris scissors.
Consent 1/Introductory Paragraph: The rationale for Mohs was explained to the patient and consent was obtained. The risks, benefits and alternatives to therapy were discussed in detail. Specifically, the risks of infection, scarring, bleeding, prolonged wound healing, incomplete removal, allergy to anesthesia, nerve injury and recurrence were addressed. Prior to the procedure, the treatment site was clearly identified and confirmed by the patient. All components of Universal Protocol/PAUSE Rule completed.
Scc In Situ Histology Text: There was atypia and pleomorphism of keratinocytes extending throughout the entire epidermis.
Cheiloplasty (Less Than 50%) Text: A decision was made to reconstruct the defect with a  cheiloplasty. The defect was undermined extensively. Additional obicularis oris muscle was excised with a 15 blade scalpel. The defect was converted into a full thickness wedge, of less than 50% of the vertical height of the lip, to facilite a better cosmetic result. Small vessels were then tied off with 5-0 monocyrl. The obicularis oris, superficial fascia, adipose and dermis were then reapproximated. After the deeper layers were approximated the epidermis was reapproximated with particular care given to realign the vermillion border.
Non-Graft Cartilage Fenestration Text: The cartilage was fenestrated with a 2mm punch biopsy to help facilitate healing.
Consent (Ear)/Introductory Paragraph: The rationale for Mohs was explained to the patient and consent was obtained. The risks, benefits and alternatives to therapy were discussed in detail. Specifically, the risks of ear deformity, infection, scarring, bleeding, prolonged wound healing, incomplete removal, allergy to anesthesia, nerve injury and recurrence were addressed. Prior to the procedure, the treatment site was clearly identified and confirmed by the patient. All components of Universal Protocol/PAUSE Rule completed.
V-Y Plasty Text: The defect edges were debeveled with a #15 scalpel blade. Given the location of the defect, shape of the defect and the proximity to free margins an V-Y advancement flap was deemed most appropriate. Using a sterile surgical marker, an appropriate advancement flap was drawn incorporating the defect and placing the expected incisions within the relaxed skin tension lines where possible. The area thus outlined was incised deep to adipose tissue with a #15 scalpel blade. The skin margins were undermined to an appropriate distance in all directions utilizing iris scissors.
Adjacent Tissue Transfer Text: The defect edges were debeveled with a #15 scalpel blade. Given the location of the defect and the proximity to free margins an adjacent tissue transfer was deemed most appropriate. Using a sterile surgical marker, an appropriate flap was drawn incorporating the defect and placing the expected incisions within the relaxed skin tension lines where possible. The area thus outlined was incised deep to adipose tissue with a #15 scalpel blade. The skin margins were undermined to an appropriate distance in all directions utilizing iris scissors.
Alar Island Pedicle Flap Text: The defect edges were debeveled with a #15 scalpel blade. Given the location of the defect, shape of the defect and the proximity to the alar rim an island pedicle advancement flap was deemed most appropriate. Using a sterile surgical marker, an appropriate advancement flap was drawn incorporating the defect, outlining the appropriate donor tissue and placing the expected incisions within the nasal ala running parallel to the alar rim. The area thus outlined was incised with a #15 scalpel blade. The skin margins were undermined minimally to an appropriate distance in all directions around the primary defect and laterally outward around the island pedicle utilizing iris scissors. There was minimal undermining beneath the pedicle flap.
No Residual Tumor Seen Histology Text: There were no malignant cells seen in the sections examined.
No Repair - Repaired With Adjacent Surgical Defect Text (Leave Blank If You Do Not Want): After obtaining clear surgical margins the defect was repaired concurrently with another surgical defect which was in close approximation.
Epidermal Sutures: 3-0 Nylon
Banner Transposition Flap Text: The defect edges were debeveled with a #15 scalpel blade. Given the location of the defect and the proximity to free margins a Banner transposition flap was deemed most appropriate. Using a sterile surgical marker, an appropriate flap drawn around the defect. The area thus outlined was incised deep to adipose tissue with a #15 scalpel blade. The skin margins were undermined to an appropriate distance in all directions utilizing iris scissors.
Melolabial Transposition Flap Text: The defect edges were debeveled with a #15 scalpel blade. Given the location of the defect and the proximity to free margins a melolabial flap was deemed most appropriate. Using a sterile surgical marker, an appropriate melolabial transposition flap was drawn incorporating the defect. The area thus outlined was incised deep to adipose tissue with a #15 scalpel blade. The skin margins were undermined to an appropriate distance in all directions utilizing iris scissors.
Cheek Interpolation Flap Text: A decision was made to reconstruct the defect utilizing an interpolation axial flap and a staged reconstruction. A telfa template was made of the defect. This telfa template was then used to outline the Cheek Interpolation flap. The donor area for the pedicle flap was then injected with anesthesia. The flap was excised through the skin and subcutaneous tissue down to the layer of the underlying musculature. The interpolation flap was carefully excised within this deep plane to maintain its blood supply. The edges of the donor site were undermined. The donor site was closed in a primary fashion. The pedicle was then rotated into position and sutured. Once the tube was sutured into place, adequate blood supply was confirmed with blanching and refill. The pedicle was then wrapped with xeroform gauze and dressed appropriately with a telfa and gauze bandage to ensure continued blood supply and protect the attached pedicle.
Retention Suture Text: Retention sutures were placed to support the closure and prevent dehiscence.
Area L Indication Text: Tumors in this location are included in Area L (trunk and extremities). Mohs surgery is indicated for larger tumors, or tumors with aggressive histologic features, in these anatomic locations.
Tumor Depth: Less than 6mm from granular layer and no invasion beyond the subcutaneous fat
O-T Advancement Flap Text: The defect edges were debeveled with a #15 scalpel blade. Given the location of the defect, shape of the defect and the proximity to free margins an O-T advancement flap was deemed most appropriate. Using a sterile surgical marker, an appropriate advancement flap was drawn incorporating the defect and placing the expected incisions within the relaxed skin tension lines where possible. The area thus outlined was incised deep to adipose tissue with a #15 scalpel blade. The skin margins were undermined to an appropriate distance in all directions utilizing iris scissors.
Manual Repair Warning Statement: We plan on removing the manually selected variable below in favor of our much easier automatic structured text blocks found in the previous tab. We decided to do this to help make the flow better and give you the full power of structured data. Manual selection is never going to be ideal in our platform and I would encourage you to avoid using manual selection from this point on, especially since I will be sunsetting this feature. It is important that you do one of two things with the customized text below. First, you can save all of the text in a word file so you can have it for future reference. Second, transfer the text to the appropriate area in the Library tab. Lastly, if there is a flap or graft type which we do not have you need to let us know right away so I can add it in before the variable is hidden. No need to panic, we plan to give you roughly 6 months to make the change.
Postop Diagnosis: same
Nostril Rim Text: The closure involved the nostril rim.
Consent (Spinal Accessory)/Introductory Paragraph: The rationale for Mohs was explained to the patient and consent was obtained. The risks, benefits and alternatives to therapy were discussed in detail. Specifically, the risks of damage to the spinal accessory nerve, infection, scarring, bleeding, prolonged wound healing, incomplete removal, allergy to anesthesia, and recurrence were addressed. Prior to the procedure, the treatment site was clearly identified and confirmed by the patient. All components of Universal Protocol/PAUSE Rule completed.
Medical Necessity Statement: Based on my medical judgement, Mohs surgery is the most appropriate treatment for this cancer compared to other treatments.
Star Wedge Flap Text: The defect edges were debeveled with a #15 scalpel blade. Given the location of the defect, shape of the defect and the proximity to free margins a star wedge flap was deemed most appropriate. Using a sterile surgical marker, an appropriate rotation flap was drawn incorporating the defect and placing the expected incisions within the relaxed skin tension lines where possible. The area thus outlined was incised deep to adipose tissue with a #15 scalpel blade. The skin margins were undermined to an appropriate distance in all directions utilizing iris scissors.
Modified Advancement Flap Text: The defect edges were debeveled with a #15 scalpel blade. Given the location of the defect, shape of the defect and the proximity to free margins a modified advancement flap was deemed most appropriate. Using a sterile surgical marker, an appropriate advancement flap was drawn incorporating the defect and placing the expected incisions within the relaxed skin tension lines where possible. The area thus outlined was incised deep to adipose tissue with a #15 scalpel blade. The skin margins were undermined to an appropriate distance in all directions utilizing iris scissors.
Partial Purse String (Intermediate) Text: Given the location of the defect and the characteristics of the surrounding skin an intermediate purse string closure was deemed most appropriate. Undermining was performed circumfirentially around the surgical defect. A purse string suture was then placed and tightened. Wound tension only allowed a partial closure of the circular defect.
Hemostasis: Electrocautery
Dermal Autograft Text: The defect edges were debeveled with a #15 scalpel blade. Given the location of the defect, shape of the defect and the proximity to free margins a dermal autograft was deemed most appropriate. Using a sterile surgical marker, the primary defect shape was transferred to the donor site. The area thus outlined was incised deep to adipose tissue with a #15 scalpel blade. The harvested graft was then trimmed of adipose and epidermal tissue until only dermis was left. The skin graft was then placed in the primary defect and oriented appropriately.
Suturegard Intro: Intraoperative tissue expansion was performed, utilizing the SUTUREGARD device, in order to reduce wound tension.
Epidermal Closure: running and interrupted
Area H Indication Text: Tumors in this location are included in Area H (eyelids, eyebrows, nose, lips, chin, ear, pre-auricular, post-auricular, temple, genitalia, hands, feet, ankles and areola). Tissue conservation is critical in these anatomic locations.
Crescentic Advancement Flap Text: The defect edges were debeveled with a #15 scalpel blade. Given the location of the defect and the proximity to free margins a crescentic advancement flap was deemed most appropriate. Using a sterile surgical marker, the appropriate advancement flap was drawn incorporating the defect and placing the expected incisions within the relaxed skin tension lines where possible. The area thus outlined was incised deep to adipose tissue with a #15 scalpel blade. The skin margins were undermined to an appropriate distance in all directions utilizing iris scissors.
O-Z Plasty Text: The defect edges were debeveled with a #15 scalpel blade. Given the location of the defect, shape of the defect and the proximity to free margins an O-Z plasty (double transposition flap) was deemed most appropriate. Using a sterile surgical marker, the appropriate transposition flaps were drawn incorporating the defect and placing the expected incisions within the relaxed skin tension lines where possible. The area thus outlined was incised deep to adipose tissue with a #15 scalpel blade. The skin margins were undermined to an appropriate distance in all directions utilizing iris scissors. Hemostasis was achieved with electrocautery. The flaps were then transposed into place, one clockwise and the other counterclockwise, and anchored with interrupted buried subcutaneous sutures.
Split-Thickness Skin Graft Text: The defect edges were debeveled with a #15 scalpel blade. Given the location of the defect, shape of the defect and the proximity to free margins a split thickness skin graft was deemed most appropriate. Using a sterile surgical marker, the primary defect shape was transferred to the donor site. The split thickness graft was then harvested. The skin graft was then placed in the primary defect and oriented appropriately.
Posterior Auricular Interpolation Flap Text: A decision was made to reconstruct the defect utilizing an interpolation axial flap and a staged reconstruction. A telfa template was made of the defect. This telfa template was then used to outline the posterior auricular interpolation flap. The donor area for the pedicle flap was then injected with anesthesia. The flap was excised through the skin and subcutaneous tissue down to the layer of the underlying musculature. The pedicle flap was carefully excised within this deep plane to maintain its blood supply. The edges of the donor site were undermined. The donor site was closed in a primary fashion. The pedicle was then rotated into position and sutured. Once the tube was sutured into place, adequate blood supply was confirmed with blanching and refill. The pedicle was then wrapped with xeroform gauze and dressed appropriately with a telfa and gauze bandage to ensure continued blood supply and protect the attached pedicle.
Z Plasty Text: The lesion was extirpated to the level of the fat with a #15 scalpel blade. Given the location of the defect, shape of the defect and the proximity to free margins a Z-plasty was deemed most appropriate for repair. Using a sterile surgical marker, the appropriate transposition arms of the Z-plasty were drawn incorporating the defect and placing the expected incisions within the relaxed skin tension lines where possible. The area thus outlined was incised deep to adipose tissue with a #15 scalpel blade. The skin margins were undermined to an appropriate distance in all directions utilizing iris scissors. The opposing transposition arms were then transposed into place in opposite direction and anchored with interrupted buried subcutaneous sutures.
Composite Graft Text: The defect edges were debeveled with a #15 scalpel blade. Given the location of the defect, shape of the defect, the proximity to free margins and the fact the defect was full thickness a composite graft was deemed most appropriate. The defect was outline and then transferred to the donor site. A full thickness graft was then excised from the donor site. The graft was then placed in the primary defect, oriented appropriately and then sutured into place. The secondary defect was then repaired using a primary closure.
Unna Boot Text: An Unna boot was placed to help immobilize the limb and facilitate more rapid healing.
Suture Removal: 14 days
Deep Sutures: 3-0 Vicryl
Surgeon Performing Repair (Optional): Dr. Jacob Worley
Island Pedicle Flap Text: The defect edges were debeveled with a #15 scalpel blade. Given the location of the defect, shape of the defect and the proximity to free margins an island pedicle advancement flap was deemed most appropriate. Using a sterile surgical marker, an appropriate advancement flap was drawn incorporating the defect, outlining the appropriate donor tissue and placing the expected incisions within the relaxed skin tension lines where possible. The area thus outlined was incised deep to adipose tissue with a #15 scalpel blade. The skin margins were undermined to an appropriate distance in all directions around the primary defect and laterally outward around the island pedicle utilizing iris scissors. There was minimal undermining beneath the pedicle flap.
Graft Cartilage Fenestration Text: The cartilage was fenestrated with a 2mm punch biopsy to help facilitate graft survival and healing.
Bilateral Helical Rim Advancement Flap Text: The defect edges were debeveled with a #15 blade scalpel. Given the location of the defect and the proximity to free margins (helical rim) a bilateral helical rim advancement flap was deemed most appropriate. Using a sterile surgical marker, the appropriate advancement flaps were drawn incorporating the defect and placing the expected incisions between the helical rim and antihelix where possible. The area thus outlined was incised through and through with a #15 scalpel blade. With a skin hook and iris scissors, the flaps were gently and sharply undermined and freed up.
Nasalis-Muscle-Based Myocutaneous Island Pedicle Flap Text: Using a #15 blade, an incision was made around the donor flap to the level of the nasalis muscle. Wide lateral undermining was then performed in both the subcutaneous plane above the nasalis muscle, and in a submuscular plane just above periosteum. This allowed the formation of a free nasalis muscle axial pedicle (based on the angular artery) which was still attached to the actual cutaneous flap, increasing its mobility and vascular viability. Hemostasis was obtained with pinpoint electrocoagulation. The flap was mobilized into position and the pivotal anchor points positioned and stabilized with buried interrupted sutures. Subcutaneous and dermal tissues were closed in a multilayered fashion with sutures. Tissue redundancies were excised, and the epidermal edges were apposed without significant tension and sutured with sutures.
Graft Donor Site Bandage (Optional-Leave Blank If You Don't Want In Note): Steri-strips and a pressure bandage were applied to the donor site.
Spiral Flap Text: The defect edges were debeveled with a #15 scalpel blade. Given the location of the defect, shape of the defect and the proximity to free margins a spiral flap was deemed most appropriate. Using a sterile surgical marker, an appropriate rotation flap was drawn incorporating the defect and placing the expected incisions within the relaxed skin tension lines where possible. The area thus outlined was incised deep to adipose tissue with a #15 scalpel blade. The skin margins were undermined to an appropriate distance in all directions utilizing iris scissors.
Graft Donor Site Dermal Sutures (Optional): 5-0 Vicryl
Purse String (Intermediate) Text: Given the location of the defect and the characteristics of the surrounding skin a pursestring intermediate closure was deemed most appropriate. Undermining was performed circumfirentially around the surgical defect. A purstring suture was then placed and tightened.
Advancement-Rotation Flap Text: The defect edges were debeveled with a #15 scalpel blade. Given the location of the defect, shape of the defect and the proximity to free margins an advancement-rotation flap was deemed most appropriate. Using a sterile surgical marker, an appropriate flap was drawn incorporating the defect and placing the expected incisions within the relaxed skin tension lines where possible. The area thus outlined was incised deep to adipose tissue with a #15 scalpel blade. The skin margins were undermined to an appropriate distance in all directions utilizing iris scissors.
Melolabial Interpolation Flap Text: A decision was made to reconstruct the defect utilizing an interpolation axial flap and a staged reconstruction. A telfa template was made of the defect. This telfa template was then used to outline the melolabial interpolation flap. The donor area for the pedicle flap was then injected with anesthesia. The flap was excised through the skin and subcutaneous tissue down to the layer of the underlying musculature. The pedicle flap was carefully excised within this deep plane to maintain its blood supply. The edges of the donor site were undermined. The donor site was closed in a primary fashion. The pedicle was then rotated into position and sutured. Once the tube was sutured into place, adequate blood supply was confirmed with blanching and refill. The pedicle was then wrapped with xeroform gauze and dressed appropriately with a telfa and gauze bandage to ensure continued blood supply and protect the attached pedicle.
Where Do You Want The Question To Include Opioid Counseling Located?: Case Summary Tab
Simple / Intermediate / Complex Repair - Final Wound Length In Cm: 3.4
Scc Moderately Differentiated Histology Text: There were irregular masses of atypical cells proliferating downward in discontiguous islands detached from the overlying epidermis- moderately differentiated.
Surgeon/Pathologist Verbiage (Will Incorporate Name Of Surgeon From Intro If Not Blank): operated in two distinct and integrated capacities as the surgeon and pathologist.
Consent Type: Consent 1 (Standard)
Consent (Scalp)/Introductory Paragraph: The rationale for Mohs was explained to the patient and consent was obtained. The risks, benefits and alternatives to therapy were discussed in detail. Specifically, the risks of changes in hair growth pattern secondary to repair, infection, scarring, bleeding, prolonged wound healing, incomplete removal, allergy to anesthesia, nerve injury and recurrence were addressed. Prior to the procedure, the treatment site was clearly identified and confirmed by the patient. All components of Universal Protocol/PAUSE Rule completed.
Mohs Rapid Report Verbiage: The area of clinically evident tumor was marked with skin marking ink and appropriately hatched. The initial incision was made following the Mohs approach through the skin. The specimen was taken to the lab, divided into the necessary number of pieces, chromacoded and processed according to the Mohs protocol. This was repeated in successive stages until a tumor free defect was achieved.
Information: Selecting Yes will display possible errors in your note based on the variables you have selected. This validation is only offered as a suggestion for you. PLEASE NOTE THAT THE VALIDATION TEXT WILL BE REMOVED WHEN YOU FINALIZE YOUR NOTE. IF YOU WANT TO FAX A PRELIMINARY NOTE YOU WILL NEED TO TOGGLE THIS TO 'NO' IF YOU DO NOT WANT IT IN YOUR FAXED NOTE.
Consent (Temporal Branch)/Introductory Paragraph: The rationale for Mohs was explained to the patient and consent was obtained. The risks, benefits and alternatives to therapy were discussed in detail. Specifically, the risks of damage to the temporal branch of the facial nerve, infection, scarring, bleeding, prolonged wound healing, incomplete removal, allergy to anesthesia, and recurrence were addressed. Prior to the procedure, the treatment site was clearly identified and confirmed by the patient. All components of Universal Protocol/PAUSE Rule completed.
Scc Ka Subtype Histology Text: There were large atypical keratinocytes with eosinophilic âglassyâ cytoplasm, large pale squamous cells with little keratinization.
Full Thickness Lip Wedge Repair (Flap) Text: Given the location of the defect and the proximity to free margins a full thickness wedge repair was deemed most appropriate. Using a sterile surgical marker, the appropriate repair was drawn incorporating the defect and placing the expected incisions perpendicular to the vermillion border. The vermillion border was also meticulously outlined to ensure appropriate reapproximation during the repair. The area thus outlined was incised through and through with a #15 scalpel blade. The muscularis and dermis were reaproximated with deep sutures following hemostasis. Care was taken to realign the vermillion border before proceeding with the superficial closure. Once the vermillion was realigned the superfical and mucosal closure was finished.

## 2022-06-06 NOTE — PROCEDURE: MIPS QUALITY
Quality 110: Preventive Care And Screening: Influenza Immunization: Influenza Immunization previously received during influenza season
Quality 111:Pneumonia Vaccination Status For Older Adults: Pneumococcal Vaccination not Administered or Previously Received, Reason not Otherwise Specified
Detail Level: Detailed
Quality 131: Pain Assessment And Follow-Up: Pain assessment using a standardized tool is documented as negative, no follow-up plan required
Quality 130: Documentation Of Current Medications In The Medical Record: Current Medications Documented

## 2022-06-20 ENCOUNTER — APPOINTMENT (RX ONLY)
Dept: URBAN - METROPOLITAN AREA CLINIC 91 | Facility: CLINIC | Age: 52
Setting detail: DERMATOLOGY
End: 2022-06-20

## 2022-06-20 DIAGNOSIS — Z48.02 ENCOUNTER FOR REMOVAL OF SUTURES: ICD-10-CM

## 2022-06-20 PROCEDURE — ? SUTURE REMOVAL (GLOBAL PERIOD)

## 2022-06-20 PROCEDURE — ? COUNSELING

## 2022-06-20 ASSESSMENT — LOCATION SIMPLE DESCRIPTION DERM: LOCATION SIMPLE: POSTERIOR SCALP

## 2022-06-20 ASSESSMENT — LOCATION ZONE DERM: LOCATION ZONE: SCALP

## 2022-06-20 ASSESSMENT — LOCATION DETAILED DESCRIPTION DERM: LOCATION DETAILED: POSTERIOR MID-PARIETAL SCALP

## 2022-06-20 NOTE — PROCEDURE: MIPS QUALITY
Detail Level: Detailed
Quality 131: Pain Assessment And Follow-Up: Pain assessment using a standardized tool is documented as negative, no follow-up plan required
Quality 111:Pneumonia Vaccination Status For Older Adults: Pneumococcal Vaccination not Administered or Previously Received, Reason not Otherwise Specified
Quality 130: Documentation Of Current Medications In The Medical Record: Current Medications Documented
Quality 110: Preventive Care And Screening: Influenza Immunization: Influenza Immunization previously received during influenza season

## 2022-06-20 NOTE — PROCEDURE: SUTURE REMOVAL (GLOBAL PERIOD)
Detail Level: Detailed
Add 53365 Cpt? (Important Note: In 2017 The Use Of 38429 Is Being Tracked By Cms To Determine Future Global Period Reimbursement For Global Periods): no

## 2022-10-12 ENCOUNTER — RX ONLY (OUTPATIENT)
Age: 52
Setting detail: RX ONLY
End: 2022-10-12

## 2022-10-12 RX ORDER — KETOCONAZOLE 20 MG/ML
SHAMPOO, SUSPENSION TOPICAL
Qty: 120 | Refills: 5 | Status: ERX

## 2022-10-12 RX ORDER — TACROLIMUS 1 MG/G
OINTMENT TOPICAL
Qty: 60 | Refills: 8 | Status: ERX

## 2022-12-01 ENCOUNTER — APPOINTMENT (RX ONLY)
Dept: URBAN - METROPOLITAN AREA CLINIC 91 | Facility: CLINIC | Age: 52
Setting detail: DERMATOLOGY
End: 2022-12-01

## 2022-12-01 DIAGNOSIS — L57.8 OTHER SKIN CHANGES DUE TO CHRONIC EXPOSURE TO NONIONIZING RADIATION: ICD-10-CM | Status: INADEQUATELY CONTROLLED

## 2022-12-01 DIAGNOSIS — Z71.89 OTHER SPECIFIED COUNSELING: ICD-10-CM

## 2022-12-01 DIAGNOSIS — L71.8 OTHER ROSACEA: ICD-10-CM | Status: INADEQUATELY CONTROLLED

## 2022-12-01 PROCEDURE — ? PRESCRIPTION

## 2022-12-01 PROCEDURE — ? SUNSCREEN RECOMMENDATIONS

## 2022-12-01 PROCEDURE — 99214 OFFICE O/P EST MOD 30 MIN: CPT

## 2022-12-01 PROCEDURE — ? COUNSELING

## 2022-12-01 RX ORDER — METRONIDAZOLE 7.5 MG/G
GEL TOPICAL
Qty: 1 | Refills: 11 | Status: ERX | COMMUNITY
Start: 2022-12-01

## 2022-12-01 RX ADMIN — METRONIDAZOLE: 7.5 GEL TOPICAL at 00:00

## 2022-12-01 ASSESSMENT — LOCATION SIMPLE DESCRIPTION DERM
LOCATION SIMPLE: LEFT FOREARM
LOCATION SIMPLE: RIGHT FOREARM
LOCATION SIMPLE: LEFT CHEEK
LOCATION SIMPLE: RIGHT CHEEK
LOCATION SIMPLE: LEFT SHOULDER
LOCATION SIMPLE: RIGHT SHOULDER

## 2022-12-01 ASSESSMENT — LOCATION DETAILED DESCRIPTION DERM
LOCATION DETAILED: RIGHT POSTERIOR SHOULDER
LOCATION DETAILED: RIGHT INFERIOR CENTRAL MALAR CHEEK
LOCATION DETAILED: LEFT DISTAL RADIAL DORSAL FOREARM
LOCATION DETAILED: RIGHT DISTAL RADIAL DORSAL FOREARM
LOCATION DETAILED: LEFT POSTERIOR SHOULDER
LOCATION DETAILED: LEFT INFERIOR CENTRAL MALAR CHEEK

## 2022-12-01 ASSESSMENT — LOCATION ZONE DERM
LOCATION ZONE: ARM
LOCATION ZONE: FACE

## 2023-08-11 ENCOUNTER — RX ONLY (OUTPATIENT)
Age: 53
Setting detail: RX ONLY
End: 2023-08-11

## 2023-08-11 RX ORDER — KETOCONAZOLE 20 MG/ML
SHAMPOO, SUSPENSION TOPICAL
Qty: 120 | Refills: 5 | Status: ERX | COMMUNITY
Start: 2023-08-11 | End: 2024-04-11

## 2023-08-25 ENCOUNTER — APPOINTMENT (RX ONLY)
Dept: URBAN - METROPOLITAN AREA CLINIC 91 | Facility: CLINIC | Age: 53
Setting detail: DERMATOLOGY
End: 2023-08-25

## 2023-08-25 DIAGNOSIS — Z85.828 PERSONAL HISTORY OF OTHER MALIGNANT NEOPLASM OF SKIN: ICD-10-CM

## 2023-08-25 DIAGNOSIS — L82.1 OTHER SEBORRHEIC KERATOSIS: ICD-10-CM

## 2023-08-25 DIAGNOSIS — Z71.89 OTHER SPECIFIED COUNSELING: ICD-10-CM

## 2023-08-25 DIAGNOSIS — L71.8 OTHER ROSACEA: ICD-10-CM | Status: WELL CONTROLLED

## 2023-08-25 DIAGNOSIS — L57.8 OTHER SKIN CHANGES DUE TO CHRONIC EXPOSURE TO NONIONIZING RADIATION: ICD-10-CM

## 2023-08-25 PROCEDURE — ? PRESCRIPTION

## 2023-08-25 PROCEDURE — ? COUNSELING

## 2023-08-25 PROCEDURE — 99214 OFFICE O/P EST MOD 30 MIN: CPT

## 2023-08-25 PROCEDURE — ? PRESCRIPTION MEDICATION MANAGEMENT

## 2023-08-25 PROCEDURE — ? SUNSCREEN RECOMMENDATIONS

## 2023-08-25 RX ORDER — OXYMETAZOLINE HYDROCHLORIDE 1 G/100G
CREAM TOPICAL
Qty: 30 | Refills: 5 | Status: ERX | COMMUNITY
Start: 2023-08-25 | End: 2023-09-25

## 2023-08-25 RX ADMIN — OXYMETAZOLINE HYDROCHLORIDE: 1 CREAM TOPICAL at 00:00

## 2023-08-25 ASSESSMENT — LOCATION SIMPLE DESCRIPTION DERM
LOCATION SIMPLE: RIGHT CHEEK
LOCATION SIMPLE: LEFT CHEEK
LOCATION SIMPLE: RIGHT SHOULDER
LOCATION SIMPLE: RIGHT FOREARM
LOCATION SIMPLE: LEFT FOREARM
LOCATION SIMPLE: LEFT SHOULDER
LOCATION SIMPLE: RIGHT UPPER BACK

## 2023-08-25 ASSESSMENT — LOCATION DETAILED DESCRIPTION DERM
LOCATION DETAILED: RIGHT DISTAL RADIAL DORSAL FOREARM
LOCATION DETAILED: LEFT INFERIOR CENTRAL MALAR CHEEK
LOCATION DETAILED: RIGHT INFERIOR CENTRAL MALAR CHEEK
LOCATION DETAILED: RIGHT MID-UPPER BACK
LOCATION DETAILED: LEFT POSTERIOR SHOULDER
LOCATION DETAILED: LEFT DISTAL RADIAL DORSAL FOREARM
LOCATION DETAILED: RIGHT POSTERIOR SHOULDER

## 2023-08-25 ASSESSMENT — LOCATION ZONE DERM
LOCATION ZONE: FACE
LOCATION ZONE: TRUNK
LOCATION ZONE: ARM

## 2023-08-25 NOTE — PROCEDURE: PRESCRIPTION MEDICATION MANAGEMENT
Initiate Treatment: Rhofade 1 % topical cream, Apply daily to face in the morning.
Continue Regimen: metronidazole 0.75 % topical gel, Apply qhs to the face in the evening.
Detail Level: Zone
Render In Strict Bullet Format?: No

## 2023-12-15 ENCOUNTER — RX ONLY (OUTPATIENT)
Age: 53
Setting detail: RX ONLY
End: 2023-12-15

## 2023-12-15 RX ORDER — METRONIDAZOLE 7.5 MG/G
GEL TOPICAL
Qty: 45 | Refills: 6 | Status: ERX

## 2024-03-29 ENCOUNTER — APPOINTMENT (RX ONLY)
Dept: URBAN - METROPOLITAN AREA CLINIC 91 | Facility: CLINIC | Age: 54
Setting detail: DERMATOLOGY
End: 2024-03-29

## 2024-03-29 DIAGNOSIS — L71.8 OTHER ROSACEA: ICD-10-CM | Status: IMPROVED

## 2024-03-29 DIAGNOSIS — L57.8 OTHER SKIN CHANGES DUE TO CHRONIC EXPOSURE TO NONIONIZING RADIATION: ICD-10-CM

## 2024-03-29 DIAGNOSIS — Z85.828 PERSONAL HISTORY OF OTHER MALIGNANT NEOPLASM OF SKIN: ICD-10-CM

## 2024-03-29 DIAGNOSIS — Z71.89 OTHER SPECIFIED COUNSELING: ICD-10-CM

## 2024-03-29 DIAGNOSIS — D22 MELANOCYTIC NEVI: ICD-10-CM

## 2024-03-29 DIAGNOSIS — L85.3 XEROSIS CUTIS: ICD-10-CM

## 2024-03-29 PROBLEM — D22.61 MELANOCYTIC NEVI OF RIGHT UPPER LIMB, INCLUDING SHOULDER: Status: ACTIVE | Noted: 2024-03-29

## 2024-03-29 PROCEDURE — ? PRESCRIPTION

## 2024-03-29 PROCEDURE — ? COUNSELING

## 2024-03-29 PROCEDURE — ? PRESCRIPTION MEDICATION MANAGEMENT

## 2024-03-29 PROCEDURE — 99214 OFFICE O/P EST MOD 30 MIN: CPT

## 2024-03-29 PROCEDURE — ? SUNSCREEN RECOMMENDATIONS

## 2024-03-29 RX ORDER — METRONIDAZOLE 7.5 MG/G
GEL TOPICAL
Qty: 45 | Refills: 11 | Status: ERX

## 2024-03-29 ASSESSMENT — LOCATION DETAILED DESCRIPTION DERM
LOCATION DETAILED: RIGHT ANTERIOR PROXIMAL THIGH
LOCATION DETAILED: LEFT INFERIOR CENTRAL MALAR CHEEK
LOCATION DETAILED: LEFT POSTERIOR SHOULDER
LOCATION DETAILED: RIGHT POSTERIOR SHOULDER
LOCATION DETAILED: RIGHT ANTECUBITAL SKIN
LOCATION DETAILED: LEFT ANTERIOR PROXIMAL THIGH
LOCATION DETAILED: RIGHT ANTERIOR DISTAL UPPER ARM
LOCATION DETAILED: LEFT ANTERIOR DISTAL UPPER ARM
LOCATION DETAILED: LEFT DISTAL RADIAL DORSAL FOREARM
LOCATION DETAILED: RIGHT DISTAL RADIAL DORSAL FOREARM
LOCATION DETAILED: RIGHT INFERIOR CENTRAL MALAR CHEEK

## 2024-03-29 ASSESSMENT — LOCATION SIMPLE DESCRIPTION DERM
LOCATION SIMPLE: LEFT FOREARM
LOCATION SIMPLE: RIGHT CHEEK
LOCATION SIMPLE: RIGHT UPPER ARM
LOCATION SIMPLE: RIGHT FOREARM
LOCATION SIMPLE: LEFT UPPER ARM
LOCATION SIMPLE: LEFT THIGH
LOCATION SIMPLE: LEFT SHOULDER
LOCATION SIMPLE: RIGHT SHOULDER
LOCATION SIMPLE: RIGHT THIGH
LOCATION SIMPLE: LEFT CHEEK

## 2024-03-29 ASSESSMENT — LOCATION ZONE DERM
LOCATION ZONE: FACE
LOCATION ZONE: ARM
LOCATION ZONE: LEG

## 2024-03-29 NOTE — PROCEDURE: PRESCRIPTION MEDICATION MANAGEMENT
Continue Regimen: metronidazole 0.75 % topical gel, Apply qhs to the face in the evening.
Detail Level: Zone
Render In Strict Bullet Format?: No

## 2024-04-08 ENCOUNTER — RX ONLY (OUTPATIENT)
Age: 54
Setting detail: RX ONLY
End: 2024-04-08

## 2024-04-08 RX ORDER — KETOCONAZOLE 20 MG/ML
SHAMPOO, SUSPENSION TOPICAL
Qty: 120 | Refills: 5 | Status: ERX

## 2024-09-05 ENCOUNTER — APPOINTMENT (RX ONLY)
Dept: URBAN - METROPOLITAN AREA CLINIC 91 | Facility: CLINIC | Age: 54
Setting detail: DERMATOLOGY
End: 2024-09-05

## 2024-09-05 DIAGNOSIS — L71.8 OTHER ROSACEA: ICD-10-CM | Status: WELL CONTROLLED

## 2024-09-05 DIAGNOSIS — Z71.89 OTHER SPECIFIED COUNSELING: ICD-10-CM

## 2024-09-05 DIAGNOSIS — Z85.828 PERSONAL HISTORY OF OTHER MALIGNANT NEOPLASM OF SKIN: ICD-10-CM

## 2024-09-05 DIAGNOSIS — L57.8 OTHER SKIN CHANGES DUE TO CHRONIC EXPOSURE TO NONIONIZING RADIATION: ICD-10-CM

## 2024-09-05 DIAGNOSIS — L81.4 OTHER MELANIN HYPERPIGMENTATION: ICD-10-CM

## 2024-09-05 DIAGNOSIS — D22 MELANOCYTIC NEVI: ICD-10-CM

## 2024-09-05 PROBLEM — D22.5 MELANOCYTIC NEVI OF TRUNK: Status: ACTIVE | Noted: 2024-09-05

## 2024-09-05 PROCEDURE — ? PRESCRIPTION MEDICATION MANAGEMENT

## 2024-09-05 PROCEDURE — ? COUNSELING

## 2024-09-05 PROCEDURE — ? SUNSCREEN RECOMMENDATIONS

## 2024-09-05 PROCEDURE — 99214 OFFICE O/P EST MOD 30 MIN: CPT

## 2024-09-05 ASSESSMENT — LOCATION ZONE DERM
LOCATION ZONE: FACE
LOCATION ZONE: ARM
LOCATION ZONE: TRUNK

## 2024-09-05 ASSESSMENT — LOCATION DETAILED DESCRIPTION DERM
LOCATION DETAILED: LEFT MEDIAL SUPERIOR CHEST
LOCATION DETAILED: RIGHT MEDIAL UPPER BACK
LOCATION DETAILED: RIGHT CENTRAL MALAR CHEEK
LOCATION DETAILED: RIGHT INFERIOR CENTRAL MALAR CHEEK
LOCATION DETAILED: LEFT DISTAL RADIAL DORSAL FOREARM
LOCATION DETAILED: LEFT INFERIOR CENTRAL MALAR CHEEK
LOCATION DETAILED: RIGHT DISTAL RADIAL DORSAL FOREARM
LOCATION DETAILED: LEFT POSTERIOR SHOULDER
LOCATION DETAILED: LEFT RIB CAGE
LOCATION DETAILED: RIGHT POSTERIOR SHOULDER
LOCATION DETAILED: LEFT CENTRAL MALAR CHEEK
LOCATION DETAILED: RIGHT SUPERIOR MEDIAL UPPER BACK

## 2024-09-05 ASSESSMENT — LOCATION SIMPLE DESCRIPTION DERM
LOCATION SIMPLE: ABDOMEN
LOCATION SIMPLE: RIGHT CHEEK
LOCATION SIMPLE: RIGHT SHOULDER
LOCATION SIMPLE: CHEST
LOCATION SIMPLE: LEFT FOREARM
LOCATION SIMPLE: LEFT CHEEK
LOCATION SIMPLE: LEFT SHOULDER
LOCATION SIMPLE: RIGHT FOREARM
LOCATION SIMPLE: RIGHT UPPER BACK

## 2024-09-05 NOTE — PROCEDURE: PRESCRIPTION MEDICATION MANAGEMENT
Continue Regimen: metronidazole 0.75 % topical gel to the face nightly as needed
Detail Level: Zone
Render In Strict Bullet Format?: No

## 2024-10-15 ENCOUNTER — APPOINTMENT (RX ONLY)
Dept: URBAN - METROPOLITAN AREA CLINIC 91 | Facility: CLINIC | Age: 54
Setting detail: DERMATOLOGY
End: 2024-10-15

## 2024-10-15 DIAGNOSIS — T88.7XX: ICD-10-CM

## 2024-10-15 DIAGNOSIS — D49.2 NEOPLASM OF UNSPECIFIED BEHAVIOR OF BONE, SOFT TISSUE, AND SKIN: ICD-10-CM

## 2024-10-15 PROBLEM — T88.7XXA UNSPECIFIED ADVERSE EFFECT OF DRUG OR MEDICAMENT, INITIAL ENCOUNTER: Status: ACTIVE | Noted: 2024-10-15

## 2024-10-15 PROCEDURE — ? COUNSELING

## 2024-10-15 PROCEDURE — 99213 OFFICE O/P EST LOW 20 MIN: CPT | Mod: 25

## 2024-10-15 PROCEDURE — 11102 TANGNTL BX SKIN SINGLE LES: CPT

## 2024-10-15 PROCEDURE — ? BIOPSY BY SHAVE METHOD

## 2024-10-15 PROCEDURE — ? PRESCRIPTION

## 2024-10-15 RX ORDER — FLUTICASONE PROPIONATE 0.5 MG/G
CREAM TOPICAL
Qty: 60 | Refills: 0 | Status: ERX | COMMUNITY
Start: 2024-10-15

## 2024-10-15 RX ADMIN — FLUTICASONE PROPIONATE: 0.5 CREAM TOPICAL at 00:00

## 2024-10-15 ASSESSMENT — LOCATION SIMPLE DESCRIPTION DERM
LOCATION SIMPLE: LEFT AXILLARY VAULT
LOCATION SIMPLE: LEFT UPPER BACK

## 2024-10-15 ASSESSMENT — LOCATION ZONE DERM
LOCATION ZONE: TRUNK
LOCATION ZONE: AXILLAE

## 2024-10-15 ASSESSMENT — LOCATION DETAILED DESCRIPTION DERM
LOCATION DETAILED: LEFT SUPERIOR MEDIAL UPPER BACK
LOCATION DETAILED: LEFT AXILLARY VAULT

## 2024-10-15 NOTE — PROCEDURE: BIOPSY BY SHAVE METHOD
Detail Level: Detailed
Depth Of Biopsy: dermis
Was A Bandage Applied: Yes
Size Of Lesion In Cm: 0.4
X Size Of Lesion In Cm: 0
Biopsy Type: H and E
Biopsy Method: 15 blade
Anesthesia Type: 1% lidocaine without epinephrine
Anesthesia Volume In Cc: 0.5
Hemostasis: Electrocautery
Wound Care: Vaseline
Dressing: bandage
Destruction After The Procedure: No
Type Of Destruction Used: Curettage
Curettage Text: The wound bed was treated with curettage after the biopsy was performed.
Cryotherapy Text: The wound bed was treated with cryotherapy after the biopsy was performed.
Electrodesiccation Text: The wound bed was treated with electrodesiccation after the biopsy was performed.
Electrodesiccation And Curettage Text: The wound bed was treated with electrodesiccation and curettage after the biopsy was performed.
Silver Nitrate Text: The wound bed was treated with silver nitrate after the biopsy was performed.
Lab: -[id number]
Consent: Written consent was obtained and risks were reviewed including but not limited to scarring, infection, bleeding, scabbing, incomplete removal, nerve damage and allergy to anesthesia.
Post-Care Instructions: I reviewed with the patient in detail post-care instructions. Patient is to keep the biopsy site dry overnight, and then apply bacitracin twice daily until healed. Patient may apply hydrogen peroxide soaks to remove any crusting.
Notification Instructions: Patient will be notified of biopsy results. However, patient instructed to call the office if not contacted within 2 weeks.
Billing Type: Third-Party Bill
Information: Selecting Yes will display possible errors in your note based on the variables you have selected. This validation is only offered as a suggestion for you. PLEASE NOTE THAT THE VALIDATION TEXT WILL BE REMOVED WHEN YOU FINALIZE YOUR NOTE. IF YOU WANT TO FAX A PRELIMINARY NOTE YOU WILL NEED TO TOGGLE THIS TO 'NO' IF YOU DO NOT WANT IT IN YOUR FAXED NOTE.

## 2025-03-19 ENCOUNTER — APPOINTMENT (OUTPATIENT)
Dept: URBAN - METROPOLITAN AREA CLINIC 91 | Facility: CLINIC | Age: 55
Setting detail: DERMATOLOGY
End: 2025-03-19

## 2025-03-19 DIAGNOSIS — T88.7XX: ICD-10-CM | Status: WELL CONTROLLED

## 2025-03-19 DIAGNOSIS — L71.8 OTHER ROSACEA: ICD-10-CM | Status: WELL CONTROLLED

## 2025-03-19 DIAGNOSIS — D49.2 NEOPLASM OF UNSPECIFIED BEHAVIOR OF BONE, SOFT TISSUE, AND SKIN: ICD-10-CM

## 2025-03-19 DIAGNOSIS — Z85.828 PERSONAL HISTORY OF OTHER MALIGNANT NEOPLASM OF SKIN: ICD-10-CM

## 2025-03-19 DIAGNOSIS — L57.8 OTHER SKIN CHANGES DUE TO CHRONIC EXPOSURE TO NONIONIZING RADIATION: ICD-10-CM

## 2025-03-19 DIAGNOSIS — Z71.89 OTHER SPECIFIED COUNSELING: ICD-10-CM

## 2025-03-19 PROBLEM — T88.7XXA UNSPECIFIED ADVERSE EFFECT OF DRUG OR MEDICAMENT, INITIAL ENCOUNTER: Status: ACTIVE | Noted: 2025-03-19

## 2025-03-19 PROCEDURE — ? BIOPSY BY SHAVE METHOD

## 2025-03-19 PROCEDURE — 99214 OFFICE O/P EST MOD 30 MIN: CPT | Mod: 25

## 2025-03-19 PROCEDURE — 11102 TANGNTL BX SKIN SINGLE LES: CPT

## 2025-03-19 PROCEDURE — ? COUNSELING

## 2025-03-19 PROCEDURE — 11103 TANGNTL BX SKIN EA SEP/ADDL: CPT

## 2025-03-19 PROCEDURE — ? PRESCRIPTION MEDICATION MANAGEMENT

## 2025-03-19 PROCEDURE — ? SUNSCREEN RECOMMENDATIONS

## 2025-03-19 ASSESSMENT — LOCATION SIMPLE DESCRIPTION DERM
LOCATION SIMPLE: LEFT AXILLARY VAULT
LOCATION SIMPLE: LEFT FOREARM
LOCATION SIMPLE: RIGHT ANTERIOR NECK
LOCATION SIMPLE: RIGHT SHOULDER
LOCATION SIMPLE: RIGHT FOREARM
LOCATION SIMPLE: LEFT CHEEK
LOCATION SIMPLE: RIGHT CHEEK
LOCATION SIMPLE: LEFT SHOULDER

## 2025-03-19 ASSESSMENT — LOCATION DETAILED DESCRIPTION DERM
LOCATION DETAILED: RIGHT INFERIOR LATERAL NECK
LOCATION DETAILED: RIGHT POSTERIOR SHOULDER
LOCATION DETAILED: RIGHT DISTAL RADIAL DORSAL FOREARM
LOCATION DETAILED: LEFT POSTERIOR SHOULDER
LOCATION DETAILED: LEFT INFERIOR CENTRAL MALAR CHEEK
LOCATION DETAILED: LEFT DISTAL RADIAL DORSAL FOREARM
LOCATION DETAILED: RIGHT INFERIOR CENTRAL MALAR CHEEK
LOCATION DETAILED: LEFT AXILLARY VAULT

## 2025-03-19 ASSESSMENT — LOCATION ZONE DERM
LOCATION ZONE: ARM
LOCATION ZONE: AXILLAE
LOCATION ZONE: NECK
LOCATION ZONE: FACE

## 2025-03-19 NOTE — PROCEDURE: BIOPSY BY SHAVE METHOD
Detail Level: Detailed
Depth Of Biopsy: dermis
Was A Bandage Applied: Yes
Size Of Lesion In Cm: 0.5
X Size Of Lesion In Cm: 0
Biopsy Type: H and E
Biopsy Method: 15 blade
Anesthesia Type: 1% lidocaine without epinephrine
Hemostasis: Electrocautery
Wound Care: Vaseline
Dressing: bandage
Destruction After The Procedure: No
Type Of Destruction Used: Curettage
Curettage Text: The wound bed was treated with curettage after the biopsy was performed.
Cryotherapy Text: The wound bed was treated with cryotherapy after the biopsy was performed.
Electrodesiccation Text: The wound bed was treated with electrodesiccation after the biopsy was performed.
Electrodesiccation And Curettage Text: The wound bed was treated with electrodesiccation and curettage after the biopsy was performed.
Silver Nitrate Text: The wound bed was treated with silver nitrate after the biopsy was performed.
Lab: -[id number]
Medical Necessity Information: It is in your best interest to select a reason for this procedure from the list below. All of these items fulfill various CMS LCD requirements except the new and changing color options.
Consent: Written consent was obtained and risks were reviewed including but not limited to scarring, infection, bleeding, scabbing, incomplete removal, nerve damage and allergy to anesthesia.
Post-Care Instructions: I reviewed with the patient in detail post-care instructions. Patient is to keep the biopsy site dry overnight, and then apply bacitracin twice daily until healed. Patient may apply hydrogen peroxide soaks to remove any crusting.
Notification Instructions: Patient will be notified of biopsy results. However, patient instructed to call the office if not contacted within 2 weeks.
Billing Type: Third-Party Bill
Information: Selecting Yes will display possible errors in your note based on the variables you have selected. This validation is only offered as a suggestion for you. PLEASE NOTE THAT THE VALIDATION TEXT WILL BE REMOVED WHEN YOU FINALIZE YOUR NOTE. IF YOU WANT TO FAX A PRELIMINARY NOTE YOU WILL NEED TO TOGGLE THIS TO 'NO' IF YOU DO NOT WANT IT IN YOUR FAXED NOTE.
Size Of Lesion In Cm: 0.3

## 2025-03-19 NOTE — PROCEDURE: PRESCRIPTION MEDICATION MANAGEMENT
Continue Regimen: metronidazole 0.75 % topical gel daily to the face
Detail Level: Zone
Render In Strict Bullet Format?: No
Plan: The patient can restart the fluticasone cream twice a day to any new areas as needed

## 2025-04-28 ENCOUNTER — APPOINTMENT (OUTPATIENT)
Dept: URBAN - METROPOLITAN AREA CLINIC 91 | Facility: CLINIC | Age: 55
Setting detail: DERMATOLOGY
End: 2025-04-28

## 2025-04-28 DIAGNOSIS — D49.2 NEOPLASM OF UNSPECIFIED BEHAVIOR OF BONE, SOFT TISSUE, AND SKIN: ICD-10-CM

## 2025-04-28 PROBLEM — C44.41 BASAL CELL CARCINOMA OF SKIN OF SCALP AND NECK: Status: ACTIVE | Noted: 2025-04-28

## 2025-04-28 PROCEDURE — ? PATHOLOGY DISCUSSION

## 2025-04-28 PROCEDURE — ? BIOPSY BY SHAVE METHOD

## 2025-04-28 PROCEDURE — ? COUNSELING

## 2025-04-28 PROCEDURE — 11102 TANGNTL BX SKIN SINGLE LES: CPT | Mod: 59

## 2025-04-28 PROCEDURE — ? CURETTAGE AND DESTRUCTION

## 2025-04-28 PROCEDURE — 17272 DSTR MAL LES S/N/H/F/G 1.1-2: CPT

## 2025-04-28 ASSESSMENT — LOCATION DETAILED DESCRIPTION DERM: LOCATION DETAILED: LEFT MEDIAL TRAPEZIAL NECK

## 2025-04-28 ASSESSMENT — LOCATION SIMPLE DESCRIPTION DERM: LOCATION SIMPLE: POSTERIOR NECK

## 2025-04-28 ASSESSMENT — LOCATION ZONE DERM: LOCATION ZONE: NECK

## 2025-04-28 NOTE — PROCEDURE: BIOPSY BY SHAVE METHOD
Detail Level: Detailed
Depth Of Biopsy: dermis
Was A Bandage Applied: Yes
Size Of Lesion In Cm: 0.8
X Size Of Lesion In Cm: 0
Biopsy Type: H and E
Biopsy Method: 15 blade
Anesthesia Type: 1% lidocaine without epinephrine
Anesthesia Volume In Cc: 0.5
Hemostasis: Electrocautery
Wound Care: Vaseline
Dressing: bandage
Destruction After The Procedure: No
Type Of Destruction Used: Curettage
Curettage Text: The wound bed was treated with curettage after the biopsy was performed.
Cryotherapy Text: The wound bed was treated with cryotherapy after the biopsy was performed.
Electrodesiccation Text: The wound bed was treated with electrodesiccation after the biopsy was performed.
Electrodesiccation And Curettage Text: The wound bed was treated with electrodesiccation and curettage after the biopsy was performed.
Silver Nitrate Text: The wound bed was treated with silver nitrate after the biopsy was performed.
Lab: -[id number]
Medical Necessity Information: It is in your best interest to select a reason for this procedure from the list below. All of these items fulfill various CMS LCD requirements except the new and changing color options.
Consent: Written consent was obtained and risks were reviewed including but not limited to scarring, infection, bleeding, scabbing, incomplete removal, nerve damage and allergy to anesthesia.
Post-Care Instructions: I reviewed with the patient in detail post-care instructions. Patient is to keep the biopsy site dry overnight, and then apply bacitracin twice daily until healed. Patient may apply hydrogen peroxide soaks to remove any crusting.
Notification Instructions: Patient will be notified of biopsy results. However, patient instructed to call the office if not contacted within 2 weeks.
Billing Type: Third-Party Bill
Information: Selecting Yes will display possible errors in your note based on the variables you have selected. This validation is only offered as a suggestion for you. PLEASE NOTE THAT THE VALIDATION TEXT WILL BE REMOVED WHEN YOU FINALIZE YOUR NOTE. IF YOU WANT TO FAX A PRELIMINARY NOTE YOU WILL NEED TO TOGGLE THIS TO 'NO' IF YOU DO NOT WANT IT IN YOUR FAXED NOTE.

## 2025-07-17 ENCOUNTER — RX ONLY (RX ONLY)
Age: 55
End: 2025-07-17

## 2025-07-17 RX ORDER — FLUTICASONE PROPIONATE 0.5 MG/G
CREAM TOPICAL
Qty: 60 | Refills: 2 | Status: ERX

## 2025-07-17 RX ORDER — METRONIDAZOLE 7.5 MG/G
GEL TOPICAL
Qty: 45 | Refills: 2 | Status: ERX